# Patient Record
Sex: MALE | Race: WHITE | NOT HISPANIC OR LATINO | Employment: OTHER | URBAN - METROPOLITAN AREA
[De-identification: names, ages, dates, MRNs, and addresses within clinical notes are randomized per-mention and may not be internally consistent; named-entity substitution may affect disease eponyms.]

---

## 2021-04-09 ENCOUNTER — IMMUNIZATIONS (OUTPATIENT)
Dept: FAMILY MEDICINE CLINIC | Facility: HOSPITAL | Age: 81
End: 2021-04-09

## 2022-03-04 ENCOUNTER — TELEPHONE (OUTPATIENT)
Dept: UROLOGY | Facility: MEDICAL CENTER | Age: 82
End: 2022-03-04

## 2022-03-04 NOTE — TELEPHONE ENCOUNTER
Please Triage -   New Patient- Cris Schuster     What is the reason for the patients appointment? Patient called stating he was referred to see Urology for Elevated PSA 5 82  Patient stated he will not available on 3/15, 17,22, and 28  Patient's wife has a lot of appointments  Please review and schedule  Patient is available afternoons  Imaging/Lab Results:      Do we accept the patient's insurance or is the patient Self-Pay? Provider & Plan: [facility]   Member ID#:       Has the patient had any previous urologist(s)?no        Have patient records been requested?in care every where        Has the patient had any outside testing done?every where       Does the patient have a personal history of cancer?no       Patient can be reached at :[phone]

## 2022-04-22 ENCOUNTER — OFFICE VISIT (OUTPATIENT)
Dept: UROLOGY | Facility: CLINIC | Age: 82
End: 2022-04-22
Payer: COMMERCIAL

## 2022-04-22 VITALS
RESPIRATION RATE: 18 BRPM | WEIGHT: 200.4 LBS | BODY MASS INDEX: 30.37 KG/M2 | OXYGEN SATURATION: 98 % | HEIGHT: 68 IN | DIASTOLIC BLOOD PRESSURE: 82 MMHG | HEART RATE: 85 BPM | SYSTOLIC BLOOD PRESSURE: 122 MMHG

## 2022-04-22 DIAGNOSIS — R97.20 ELEVATED PSA: Primary | ICD-10-CM

## 2022-04-22 PROCEDURE — 99203 OFFICE O/P NEW LOW 30 MIN: CPT | Performed by: PHYSICIAN ASSISTANT

## 2022-04-22 RX ORDER — AMOXICILLIN 500 MG/1
CAPSULE ORAL
COMMUNITY
Start: 2022-04-16

## 2022-04-22 RX ORDER — FLUTICASONE PROPIONATE 50 MCG
2 SPRAY, SUSPENSION (ML) NASAL DAILY
COMMUNITY
Start: 2021-11-18 | End: 2022-11-18

## 2022-04-22 RX ORDER — LATANOPROST 50 UG/ML
SOLUTION/ DROPS OPHTHALMIC
COMMUNITY
Start: 2022-03-14

## 2022-04-22 NOTE — PROGRESS NOTES
1  Elevated PSA  PSA Total, Diagnostic    PSA Total, Diagnostic         Assessment and plan:     1  Elevated PSA  - prostate moderately firm without any overt nodularity  - we discussed prostate cancer screening and the evaluation for prostate cancer   - we reviewed the potential for an underlying prostate cancer, and potential trajectories related to this  - will plan for surveillance at this time  - patient will go for a PSA next week  If stable from 5-6, then f/u 6 months repeat diagnostic PSA  If markedly rising, then may need mpMRI versus biopsy  - patient did have 4k score performed however reports results will not be released to them until RASHID performed (for insurance purposes)  He will contact our office with the laboratory information and a copy of our note will be forwarded as such  Sin Broderick PA-C      Chief Complaint     Elevated PSA    History of Present Illness     Cheryle Manjarrez is a [de-identified] y o  male presenting today as a new patient for elevated PSA  Reports he was having routine screening by PCP  PSA trend:  5 2 (8/26/21)  PSA 5 82 (11/30/21)    Reports some mild progressive LUTS with weakened stream  Denies dysuria, gross hematuria, urinary infections, urinary incontinence  Off of pharmacotherapy for his prostate or bladder  3    Reports limited prostate screening over his lifetime with his last RASHID being >20 years ago  Denies any previous history of  surgical manipulation  Review of Systems     Review of Systems   Constitutional: Negative for activity change, appetite change, chills, diaphoresis, fatigue, fever and unexpected weight change  Respiratory: Negative for chest tightness and shortness of breath  Cardiovascular: Negative for chest pain, palpitations and leg swelling  Gastrointestinal: Negative for abdominal distention, abdominal pain, constipation, diarrhea, nausea and vomiting     Genitourinary: Positive for decreased urine volume (weakened stream)  Negative for difficulty urinating, dysuria, enuresis, flank pain, frequency, genital sores, hematuria and urgency  Musculoskeletal: Negative for back pain, gait problem and myalgias  Skin: Negative for color change, pallor, rash and wound  Psychiatric/Behavioral: Negative for behavioral problems  The patient is not nervous/anxious  Allergies     No Known Allergies    Physical Exam     Physical Exam  Constitutional:       General: He is not in acute distress  Appearance: Normal appearance  He is normal weight  He is not ill-appearing, toxic-appearing or diaphoretic  HENT:      Head: Normocephalic and atraumatic  Eyes:      General:         Right eye: No discharge  Left eye: No discharge  Conjunctiva/sclera: Conjunctivae normal    Pulmonary:      Effort: Pulmonary effort is normal  No respiratory distress  Genitourinary:     Comments: Good rectal tone  Prostate 50g, moderately firm without any over nodularities  Musculoskeletal:         General: No swelling or tenderness  Normal range of motion  Skin:     General: Skin is warm and dry  Coloration: Skin is not jaundiced or pale  Neurological:      General: No focal deficit present  Mental Status: He is alert and oriented to person, place, and time     Psychiatric:         Mood and Affect: Mood normal          Behavior: Behavior normal            Vital Signs     Vitals:    04/22/22 1333   BP: 122/82   BP Location: Left arm   Patient Position: Sitting   Cuff Size: Adult   Pulse: 85   Resp: 18   SpO2: 98%   Weight: 90 9 kg (200 lb 6 4 oz)   Height: 5' 8" (1 727 m)         Current Medications       Current Outpatient Medications:     latanoprost (XALATAN) 0 005 % ophthalmic solution, , Disp: , Rfl:     amoxicillin (AMOXIL) 500 mg capsule, , Disp: , Rfl:     fluticasone (FLONASE) 50 mcg/act nasal spray, 2 sprays into each nostril daily (Patient not taking: Reported on 4/22/2022 ), Disp: , Rfl:       Active Problems     There is no problem list on file for this patient  Past Medical History     History reviewed  No pertinent past medical history        Surgical History     Past Surgical History:   Procedure Laterality Date    TONSILLECTOMY      child          Family History     Family History   Problem Relation Age of Onset    Diabetes Mother     Hypertension Mother          Social History     Social History       Radiology

## 2022-04-26 LAB — PSA SERPL-MCNC: 5.33 NG/ML

## 2022-04-27 ENCOUNTER — TELEPHONE (OUTPATIENT)
Dept: UROLOGY | Facility: CLINIC | Age: 82
End: 2022-04-27

## 2022-04-27 NOTE — TELEPHONE ENCOUNTER
----- Message from Kim Jules PA-C sent at 4/27/2022  7:29 AM EDT -----  Please let patient know PSA is stable 5 3  f/u 6 months as scheduled

## 2022-04-27 NOTE — TELEPHONE ENCOUNTER
Detailed VM left per communication consent detailing kims note    Office number provided for any further questions

## 2022-04-29 ENCOUNTER — TELEPHONE (OUTPATIENT)
Dept: OTHER | Facility: OTHER | Age: 82
End: 2022-04-29

## 2022-04-29 NOTE — TELEPHONE ENCOUNTER
Leny Heart (Self) [phone] (M)     Is calling to inform the office of the below information from his visit on 04-  [place] [address] [address], [address] [place] [address]    [phone]

## 2022-10-24 ENCOUNTER — OFFICE VISIT (OUTPATIENT)
Dept: UROLOGY | Facility: CLINIC | Age: 82
End: 2022-10-24
Payer: COMMERCIAL

## 2022-10-24 VITALS
OXYGEN SATURATION: 100 % | BODY MASS INDEX: 30.49 KG/M2 | DIASTOLIC BLOOD PRESSURE: 90 MMHG | HEART RATE: 79 BPM | HEIGHT: 68 IN | SYSTOLIC BLOOD PRESSURE: 160 MMHG | WEIGHT: 201.2 LBS

## 2022-10-24 DIAGNOSIS — R97.20 ELEVATED PSA: Primary | ICD-10-CM

## 2022-10-24 PROCEDURE — 99213 OFFICE O/P EST LOW 20 MIN: CPT | Performed by: PHYSICIAN ASSISTANT

## 2022-10-24 NOTE — PROGRESS NOTES
1  Elevated PSA  PSA Total, Diagnostic    PSA Total, Diagnostic    PSA Total, Diagnostic         Assessment and plan:     1  Elevated PSA  - recent normal RASHID  - PSA was remains stable and his age-appropriate  - will return to clinic in 1 year with a repeat PSA prior  Patient has some trepidation and wishes to repeat his PSA in 6 months which is reasonable  Assuming his PSA remains stable over the next year routine screening can be discontinued thereafter     Yassine Acuña      Chief Complaint     Elevated PSA    History of Present Illness     Cash Goins is a [de-identified] y o  male presenting today for follow up of elevated PSA  Reports he was having routine screening by PCP  PSA trend:  5 2 (8/26/21)  5 82 (11/30/21)  5 33 (4/26/22)    Reports some mild progressive LUTS with weakened stream  Denies dysuria, gross hematuria, urinary infections, urinary incontinence  Off of pharmacotherapy for his prostate or bladder  3    Reports limited prostate screening over his lifetime with his last RASHID being >20 years ago  Denies any previous history of  surgical manipulation  Review of Systems     Review of Systems   Constitutional: Negative for activity change, appetite change, chills, diaphoresis, fatigue, fever and unexpected weight change  Respiratory: Negative for chest tightness and shortness of breath  Cardiovascular: Negative for chest pain, palpitations and leg swelling  Gastrointestinal: Negative for abdominal distention, abdominal pain, constipation, diarrhea, nausea and vomiting  Genitourinary: Positive for decreased urine volume (weakened stream)  Negative for difficulty urinating, dysuria, enuresis, flank pain, frequency, genital sores, hematuria and urgency  Musculoskeletal: Negative for back pain, gait problem and myalgias  Skin: Negative for color change, pallor, rash and wound  Psychiatric/Behavioral: Negative for behavioral problems  The patient is not nervous/anxious  Allergies     No Known Allergies    Physical Exam     Physical Exam  Constitutional:       General: He is not in acute distress  Appearance: Normal appearance  He is normal weight  He is not ill-appearing, toxic-appearing or diaphoretic  HENT:      Head: Normocephalic and atraumatic  Eyes:      General:         Right eye: No discharge  Left eye: No discharge  Conjunctiva/sclera: Conjunctivae normal    Pulmonary:      Effort: Pulmonary effort is normal  No respiratory distress  Genitourinary:     Comments: Good rectal tone  Prostate 50g, moderately firm without any over nodularities  Musculoskeletal:         General: No swelling or tenderness  Normal range of motion  Skin:     General: Skin is warm and dry  Coloration: Skin is not jaundiced or pale  Neurological:      General: No focal deficit present  Mental Status: He is alert and oriented to person, place, and time  Psychiatric:         Mood and Affect: Mood normal          Behavior: Behavior normal          Vital Signs     Vitals:    10/24/22 1319   BP: 160/90   BP Location: Left arm   Patient Position: Sitting   Cuff Size: Standard   Pulse: 79   SpO2: 100%   Weight: 91 3 kg (201 lb 3 2 oz)   Height: 5' 8" (1 727 m)         Current Medications       Current Outpatient Medications:   •  latanoprost (XALATAN) 0 005 % ophthalmic solution, , Disp: , Rfl:   •  amoxicillin (AMOXIL) 500 mg capsule, , Disp: , Rfl:   •  fluticasone (FLONASE) 50 mcg/act nasal spray, 2 sprays into each nostril daily (Patient not taking: Reported on 4/22/2022 ), Disp: , Rfl:       Active Problems     There is no problem list on file for this patient  Past Medical History     History reviewed  No pertinent past medical history        Surgical History     Past Surgical History:   Procedure Laterality Date   • TONSILLECTOMY      child          Family History     Family History   Problem Relation Age of Onset   • Diabetes Mother    • Hypertension Mother          Social History     Social History       Radiology

## 2023-02-25 LAB — PSA SERPL-MCNC: 5.81 NG/ML

## 2023-06-22 ENCOUNTER — TELEPHONE (OUTPATIENT)
Dept: UROLOGY | Facility: CLINIC | Age: 83
End: 2023-06-22

## 2023-06-22 LAB — PSA SERPL-MCNC: 6.83 NG/ML

## 2023-06-22 NOTE — TELEPHONE ENCOUNTER
Called patient l/m to call back regarding the message below     ----- Message from Bev Frye PA-C sent at 6/22/2023  8:10 AM EDT -----  Please make sure patient has a follow up visit for physical exam and to review PSA  Rishabh Toure thanks

## 2023-08-31 ENCOUNTER — HOSPITAL ENCOUNTER (EMERGENCY)
Facility: HOSPITAL | Age: 83
End: 2023-09-01
Attending: EMERGENCY MEDICINE
Payer: COMMERCIAL

## 2023-08-31 DIAGNOSIS — N13.2 URETERAL STONE WITH HYDRONEPHROSIS: Primary | ICD-10-CM

## 2023-08-31 PROCEDURE — 99284 EMERGENCY DEPT VISIT MOD MDM: CPT

## 2023-09-01 ENCOUNTER — ANESTHESIA (INPATIENT)
Dept: PERIOP | Facility: HOSPITAL | Age: 83
End: 2023-09-01
Payer: COMMERCIAL

## 2023-09-01 ENCOUNTER — ANESTHESIA EVENT (INPATIENT)
Dept: PERIOP | Facility: HOSPITAL | Age: 83
End: 2023-09-01
Payer: COMMERCIAL

## 2023-09-01 ENCOUNTER — HOSPITAL ENCOUNTER (OUTPATIENT)
Facility: HOSPITAL | Age: 83
LOS: 1 days | Discharge: HOME | End: 2023-09-02
Attending: INTERNAL MEDICINE | Admitting: INTERNAL MEDICINE
Payer: COMMERCIAL

## 2023-09-01 ENCOUNTER — APPOINTMENT (INPATIENT)
Dept: RADIOLOGY | Facility: HOSPITAL | Age: 83
End: 2023-09-01
Payer: COMMERCIAL

## 2023-09-01 ENCOUNTER — APPOINTMENT (EMERGENCY)
Dept: CT IMAGING | Facility: HOSPITAL | Age: 83
End: 2023-09-01
Payer: COMMERCIAL

## 2023-09-01 VITALS
SYSTOLIC BLOOD PRESSURE: 165 MMHG | RESPIRATION RATE: 17 BRPM | DIASTOLIC BLOOD PRESSURE: 74 MMHG | TEMPERATURE: 98.7 F | OXYGEN SATURATION: 97 % | HEART RATE: 75 BPM

## 2023-09-01 DIAGNOSIS — K59.00 CONSTIPATION: ICD-10-CM

## 2023-09-01 DIAGNOSIS — H40.9 GLAUCOMA: Primary | ICD-10-CM

## 2023-09-01 DIAGNOSIS — N13.2 URETERAL STONE WITH HYDRONEPHROSIS: ICD-10-CM

## 2023-09-01 PROBLEM — N17.9 ACUTE KIDNEY FAILURE (HCC): Status: ACTIVE | Noted: 2023-09-01

## 2023-09-01 PROBLEM — E66.9 OBESITY (BMI 30-39.9): Status: ACTIVE | Noted: 2023-09-01

## 2023-09-01 PROBLEM — N40.0 BPH (BENIGN PROSTATIC HYPERPLASIA): Status: ACTIVE | Noted: 2023-09-01

## 2023-09-01 LAB
ALBUMIN SERPL BCP-MCNC: 4.2 G/DL (ref 3.5–5)
ALP SERPL-CCNC: 66 U/L (ref 34–104)
ALT SERPL W P-5'-P-CCNC: 14 U/L (ref 7–52)
ANION GAP SERPL CALCULATED.3IONS-SCNC: 9 MMOL/L
AST SERPL W P-5'-P-CCNC: 32 U/L (ref 13–39)
BASOPHILS # BLD AUTO: 0.03 THOUSANDS/ÂΜL (ref 0–0.1)
BASOPHILS NFR BLD AUTO: 0 % (ref 0–1)
BILIRUB SERPL-MCNC: 1.23 MG/DL (ref 0.2–1)
BILIRUB UR QL STRIP: NEGATIVE
BUN SERPL-MCNC: 24 MG/DL (ref 5–25)
CALCIUM SERPL-MCNC: 9.2 MG/DL (ref 8.4–10.2)
CHLORIDE SERPL-SCNC: 102 MMOL/L (ref 96–108)
CLARITY UR: CLEAR
CO2 SERPL-SCNC: 25 MMOL/L (ref 21–32)
COLOR UR: ABNORMAL
CREAT SERPL-MCNC: 2.09 MG/DL (ref 0.6–1.3)
EOSINOPHIL # BLD AUTO: 0.01 THOUSAND/ÂΜL (ref 0–0.61)
EOSINOPHIL NFR BLD AUTO: 0 % (ref 0–6)
ERYTHROCYTE [DISTWIDTH] IN BLOOD BY AUTOMATED COUNT: 12.5 % (ref 11.6–15.1)
GFR SERPL CREATININE-BSD FRML MDRD: 28 ML/MIN/1.73SQ M
GLUCOSE SERPL-MCNC: 138 MG/DL (ref 65–140)
GLUCOSE UR STRIP-MCNC: NEGATIVE MG/DL
HCT VFR BLD AUTO: 40.5 % (ref 36.5–49.3)
HGB BLD-MCNC: 13.4 G/DL (ref 12–17)
HGB UR QL STRIP.AUTO: NEGATIVE
IMM GRANULOCYTES # BLD AUTO: 0.2 THOUSAND/UL (ref 0–0.2)
IMM GRANULOCYTES NFR BLD AUTO: 2 % (ref 0–2)
KETONES UR STRIP-MCNC: ABNORMAL MG/DL
LEUKOCYTE ESTERASE UR QL STRIP: NEGATIVE
LIPASE SERPL-CCNC: <6 U/L (ref 11–82)
LYMPHOCYTES # BLD AUTO: 1.04 THOUSANDS/ÂΜL (ref 0.6–4.47)
LYMPHOCYTES NFR BLD AUTO: 13 % (ref 14–44)
MAGNESIUM SERPL-MCNC: 2.1 MG/DL (ref 1.9–2.7)
MCH RBC QN AUTO: 30 PG (ref 26.8–34.3)
MCHC RBC AUTO-ENTMCNC: 33.1 G/DL (ref 31.4–37.4)
MCV RBC AUTO: 91 FL (ref 82–98)
MONOCYTES # BLD AUTO: 0.86 THOUSAND/ÂΜL (ref 0.17–1.22)
MONOCYTES NFR BLD AUTO: 10 % (ref 4–12)
NEUTROPHILS # BLD AUTO: 6.17 THOUSANDS/ÂΜL (ref 1.85–7.62)
NEUTS SEG NFR BLD AUTO: 75 % (ref 43–75)
NITRITE UR QL STRIP: NEGATIVE
NRBC BLD AUTO-RTO: 0 /100 WBCS
PH UR STRIP.AUTO: 5.5 [PH]
PLATELET # BLD AUTO: 151 THOUSANDS/UL (ref 149–390)
PMV BLD AUTO: 9.4 FL (ref 8.9–12.7)
POTASSIUM SERPL-SCNC: 4 MMOL/L (ref 3.5–5.3)
PROT SERPL-MCNC: 7.4 G/DL (ref 6.4–8.4)
PROT UR STRIP-MCNC: NEGATIVE MG/DL
RBC # BLD AUTO: 4.46 MILLION/UL (ref 3.88–5.62)
SODIUM SERPL-SCNC: 136 MMOL/L (ref 135–147)
SP GR UR STRIP.AUTO: 1.01 (ref 1–1.03)
UROBILINOGEN UR STRIP-ACNC: <2 MG/DL
WBC # BLD AUTO: 8.31 THOUSAND/UL (ref 4.31–10.16)

## 2023-09-01 PROCEDURE — 96360 HYDRATION IV INFUSION INIT: CPT

## 2023-09-01 PROCEDURE — 99215 OFFICE O/P EST HI 40 MIN: CPT | Performed by: UROLOGY

## 2023-09-01 PROCEDURE — 36415 COLL VENOUS BLD VENIPUNCTURE: CPT

## 2023-09-01 PROCEDURE — 99223 1ST HOSP IP/OBS HIGH 75: CPT | Performed by: INTERNAL MEDICINE

## 2023-09-01 PROCEDURE — 81003 URINALYSIS AUTO W/O SCOPE: CPT

## 2023-09-01 PROCEDURE — 85025 COMPLETE CBC W/AUTO DIFF WBC: CPT

## 2023-09-01 PROCEDURE — 80053 COMPREHEN METABOLIC PANEL: CPT

## 2023-09-01 PROCEDURE — 83690 ASSAY OF LIPASE: CPT

## 2023-09-01 PROCEDURE — 99285 EMERGENCY DEPT VISIT HI MDM: CPT | Performed by: EMERGENCY MEDICINE

## 2023-09-01 PROCEDURE — C1758 CATHETER, URETERAL: HCPCS | Performed by: UROLOGY

## 2023-09-01 PROCEDURE — 96361 HYDRATE IV INFUSION ADD-ON: CPT

## 2023-09-01 PROCEDURE — C2617 STENT, NON-COR, TEM W/O DEL: HCPCS | Performed by: UROLOGY

## 2023-09-01 PROCEDURE — C1769 GUIDE WIRE: HCPCS | Performed by: UROLOGY

## 2023-09-01 PROCEDURE — 74420 UROGRAPHY RTRGR +-KUB: CPT

## 2023-09-01 PROCEDURE — 83735 ASSAY OF MAGNESIUM: CPT

## 2023-09-01 PROCEDURE — 87086 URINE CULTURE/COLONY COUNT: CPT | Performed by: UROLOGY

## 2023-09-01 PROCEDURE — 52356 CYSTO/URETERO W/LITHOTRIPSY: CPT | Performed by: UROLOGY

## 2023-09-01 PROCEDURE — 82360 CALCULUS ASSAY QUANT: CPT | Performed by: UROLOGY

## 2023-09-01 PROCEDURE — 99024 POSTOP FOLLOW-UP VISIT: CPT | Performed by: UROLOGY

## 2023-09-01 PROCEDURE — G1004 CDSM NDSC: HCPCS

## 2023-09-01 PROCEDURE — 74176 CT ABD & PELVIS W/O CONTRAST: CPT

## 2023-09-01 DEVICE — INLAY OPTIMA URETERAL STENT W/O GUIDEWIRE
Type: IMPLANTABLE DEVICE | Site: URETER | Status: FUNCTIONAL
Brand: BARD® INLAY OPTIMA® URETERAL STENT

## 2023-09-01 RX ORDER — POLYETHYLENE GLYCOL 3350 17 G/17G
17 POWDER, FOR SOLUTION ORAL DAILY
Status: DISCONTINUED | OUTPATIENT
Start: 2023-09-02 | End: 2023-09-02 | Stop reason: HOSPADM

## 2023-09-01 RX ORDER — SODIUM CHLORIDE, SODIUM GLUCONATE, SODIUM ACETATE, POTASSIUM CHLORIDE, MAGNESIUM CHLORIDE, SODIUM PHOSPHATE, DIBASIC, AND POTASSIUM PHOSPHATE .53; .5; .37; .037; .03; .012; .00082 G/100ML; G/100ML; G/100ML; G/100ML; G/100ML; G/100ML; G/100ML
75 INJECTION, SOLUTION INTRAVENOUS CONTINUOUS
Status: DISCONTINUED | OUTPATIENT
Start: 2023-09-01 | End: 2023-09-02 | Stop reason: HOSPADM

## 2023-09-01 RX ORDER — ONDANSETRON 2 MG/ML
INJECTION INTRAMUSCULAR; INTRAVENOUS AS NEEDED
Status: DISCONTINUED | OUTPATIENT
Start: 2023-09-01 | End: 2023-09-01

## 2023-09-01 RX ORDER — ACETAMINOPHEN 325 MG/1
650 TABLET ORAL EVERY 6 HOURS PRN
Status: DISCONTINUED | OUTPATIENT
Start: 2023-09-01 | End: 2023-09-02 | Stop reason: HOSPADM

## 2023-09-01 RX ORDER — HYDROMORPHONE HCL/PF 1 MG/ML
0.5 SYRINGE (ML) INJECTION
Status: DISCONTINUED | OUTPATIENT
Start: 2023-09-01 | End: 2023-09-01

## 2023-09-01 RX ORDER — MAGNESIUM HYDROXIDE 1200 MG/15ML
LIQUID ORAL AS NEEDED
Status: DISCONTINUED | OUTPATIENT
Start: 2023-09-01 | End: 2023-09-01 | Stop reason: HOSPADM

## 2023-09-01 RX ORDER — LIDOCAINE HYDROCHLORIDE 10 MG/ML
INJECTION, SOLUTION EPIDURAL; INFILTRATION; INTRACAUDAL; PERINEURAL AS NEEDED
Status: DISCONTINUED | OUTPATIENT
Start: 2023-09-01 | End: 2023-09-01

## 2023-09-01 RX ORDER — PROMETHAZINE HYDROCHLORIDE 25 MG/ML
25 INJECTION, SOLUTION INTRAMUSCULAR; INTRAVENOUS ONCE AS NEEDED
Status: DISCONTINUED | OUTPATIENT
Start: 2023-09-01 | End: 2023-09-01

## 2023-09-01 RX ORDER — ACETAMINOPHEN 325 MG/1
975 TABLET ORAL ONCE
Status: COMPLETED | OUTPATIENT
Start: 2023-09-01 | End: 2023-09-01

## 2023-09-01 RX ORDER — ONDANSETRON 2 MG/ML
4 INJECTION INTRAMUSCULAR; INTRAVENOUS EVERY 6 HOURS PRN
Status: DISCONTINUED | OUTPATIENT
Start: 2023-09-01 | End: 2023-09-02 | Stop reason: HOSPADM

## 2023-09-01 RX ORDER — SENNOSIDES 8.6 MG
2 TABLET ORAL DAILY
Status: DISCONTINUED | OUTPATIENT
Start: 2023-09-01 | End: 2023-09-02 | Stop reason: HOSPADM

## 2023-09-01 RX ORDER — LATANOPROST 50 UG/ML
1 SOLUTION/ DROPS OPHTHALMIC
Status: DISCONTINUED | OUTPATIENT
Start: 2023-09-01 | End: 2023-09-02 | Stop reason: HOSPADM

## 2023-09-01 RX ORDER — CEFAZOLIN SODIUM 1 G/3ML
INJECTION, POWDER, FOR SOLUTION INTRAMUSCULAR; INTRAVENOUS AS NEEDED
Status: DISCONTINUED | OUTPATIENT
Start: 2023-09-01 | End: 2023-09-01

## 2023-09-01 RX ORDER — SODIUM CHLORIDE, SODIUM LACTATE, POTASSIUM CHLORIDE, CALCIUM CHLORIDE 600; 310; 30; 20 MG/100ML; MG/100ML; MG/100ML; MG/100ML
INJECTION, SOLUTION INTRAVENOUS CONTINUOUS PRN
Status: DISCONTINUED | OUTPATIENT
Start: 2023-09-01 | End: 2023-09-01

## 2023-09-01 RX ORDER — TAMSULOSIN HYDROCHLORIDE 0.4 MG/1
0.4 CAPSULE ORAL ONCE
Status: COMPLETED | OUTPATIENT
Start: 2023-09-01 | End: 2023-09-01

## 2023-09-01 RX ORDER — PROPOFOL 10 MG/ML
INJECTION, EMULSION INTRAVENOUS AS NEEDED
Status: DISCONTINUED | OUTPATIENT
Start: 2023-09-01 | End: 2023-09-01

## 2023-09-01 RX ORDER — CEPHALEXIN 500 MG/1
500 CAPSULE ORAL EVERY 8 HOURS SCHEDULED
Status: DISCONTINUED | OUTPATIENT
Start: 2023-09-01 | End: 2023-09-02 | Stop reason: HOSPADM

## 2023-09-01 RX ORDER — HEPARIN SODIUM 5000 [USP'U]/ML
5000 INJECTION, SOLUTION INTRAVENOUS; SUBCUTANEOUS EVERY 8 HOURS SCHEDULED
Status: DISCONTINUED | OUTPATIENT
Start: 2023-09-01 | End: 2023-09-02 | Stop reason: HOSPADM

## 2023-09-01 RX ORDER — ONDANSETRON 2 MG/ML
4 INJECTION INTRAMUSCULAR; INTRAVENOUS ONCE AS NEEDED
Status: DISCONTINUED | OUTPATIENT
Start: 2023-09-01 | End: 2023-09-01

## 2023-09-01 RX ORDER — BISACODYL 10 MG
10 SUPPOSITORY, RECTAL RECTAL DAILY PRN
Status: DISCONTINUED | OUTPATIENT
Start: 2023-09-01 | End: 2023-09-02 | Stop reason: HOSPADM

## 2023-09-01 RX ORDER — HYDROCODONE BITARTRATE AND ACETAMINOPHEN 5; 325 MG/1; MG/1
1 TABLET ORAL EVERY 4 HOURS PRN
Status: DISCONTINUED | OUTPATIENT
Start: 2023-09-01 | End: 2023-09-02 | Stop reason: HOSPADM

## 2023-09-01 RX ORDER — DEXAMETHASONE SODIUM PHOSPHATE 10 MG/ML
INJECTION, SOLUTION INTRAMUSCULAR; INTRAVENOUS AS NEEDED
Status: DISCONTINUED | OUTPATIENT
Start: 2023-09-01 | End: 2023-09-01

## 2023-09-01 RX ORDER — FENTANYL CITRATE/PF 50 MCG/ML
50 SYRINGE (ML) INJECTION
Status: DISCONTINUED | OUTPATIENT
Start: 2023-09-01 | End: 2023-09-01

## 2023-09-01 RX ORDER — FENTANYL CITRATE 50 UG/ML
INJECTION, SOLUTION INTRAMUSCULAR; INTRAVENOUS AS NEEDED
Status: DISCONTINUED | OUTPATIENT
Start: 2023-09-01 | End: 2023-09-01

## 2023-09-01 RX ADMIN — TAMSULOSIN HYDROCHLORIDE 0.4 MG: 0.4 CAPSULE ORAL at 04:31

## 2023-09-01 RX ADMIN — PROPOFOL 160 MG: 10 INJECTION, EMULSION INTRAVENOUS at 13:43

## 2023-09-01 RX ADMIN — SODIUM CHLORIDE 1000 ML: 0.9 INJECTION, SOLUTION INTRAVENOUS at 04:08

## 2023-09-01 RX ADMIN — HEPARIN SODIUM 5000 UNITS: 5000 INJECTION INTRAVENOUS; SUBCUTANEOUS at 22:03

## 2023-09-01 RX ADMIN — FENTANYL CITRATE 50 MCG: 50 INJECTION INTRAMUSCULAR; INTRAVENOUS at 13:59

## 2023-09-01 RX ADMIN — FENTANYL CITRATE 50 MCG: 50 INJECTION INTRAMUSCULAR; INTRAVENOUS at 13:43

## 2023-09-01 RX ADMIN — DEXAMETHASONE SODIUM PHOSPHATE 10 MG: 10 INJECTION, SOLUTION INTRAMUSCULAR; INTRAVENOUS at 13:47

## 2023-09-01 RX ADMIN — LIDOCAINE HYDROCHLORIDE 50 MG: 10 INJECTION, SOLUTION EPIDURAL; INFILTRATION; INTRACAUDAL; PERINEURAL at 13:43

## 2023-09-01 RX ADMIN — SODIUM CHLORIDE, SODIUM LACTATE, POTASSIUM CHLORIDE, AND CALCIUM CHLORIDE: .6; .31; .03; .02 INJECTION, SOLUTION INTRAVENOUS at 13:17

## 2023-09-01 RX ADMIN — SODIUM CHLORIDE 1000 ML: 0.9 INJECTION, SOLUTION INTRAVENOUS at 00:34

## 2023-09-01 RX ADMIN — LATANOPROST 1 DROP: 50 SOLUTION OPHTHALMIC at 22:03

## 2023-09-01 RX ADMIN — FENTANYL CITRATE 50 MCG: 50 INJECTION INTRAMUSCULAR; INTRAVENOUS at 13:55

## 2023-09-01 RX ADMIN — ONDANSETRON 4 MG: 2 INJECTION INTRAMUSCULAR; INTRAVENOUS at 13:47

## 2023-09-01 RX ADMIN — CEPHALEXIN 500 MG: 500 CAPSULE ORAL at 22:03

## 2023-09-01 RX ADMIN — ACETAMINOPHEN 975 MG: 325 TABLET, FILM COATED ORAL at 10:33

## 2023-09-01 RX ADMIN — CEFAZOLIN 2000 MG: 1 INJECTION, POWDER, FOR SOLUTION INTRAMUSCULAR; INTRAVENOUS at 13:43

## 2023-09-01 RX ADMIN — PROPOFOL 40 MG: 10 INJECTION, EMULSION INTRAVENOUS at 13:47

## 2023-09-01 RX ADMIN — FENTANYL CITRATE 50 MCG: 50 INJECTION INTRAMUSCULAR; INTRAVENOUS at 13:47

## 2023-09-01 NOTE — PERIOPERATIVE NURSING NOTE
PACU to Floor TRANSFER NOTE      Pre-op Diagnosis:  Ureteral stone with hydronephrosis [N13.2]    Procedure Done:  CYSTOSCOPY URETEROSCOPY WITH LITHOTRIPSY HOLMIUM LASER, RETROGRADE PYELOGRAM , BAKET STONE EXTRACTION AND INSERTION STENT URETERAL (Left: Bladder)     Post Op Diagnosis:  * No post-op diagnosis entered *    Any Significant Events Intra-Op:  none    Abnormal Assessment in PACU: none    Level of Consciousness:       Last Set of VS:   Vitals:    09/01/23 1443 09/01/23 1445 09/01/23 1500 09/01/23 1515   BP: 132/68 143/71 156/64 123/62   Pulse: 93 76 80 80   Resp: 14 15 19 12   Temp: 98.8 °F (37.1 °C)  98.5 °F (36.9 °C)    SpO2: 98% 96% 97% 97%                Baseline Neuro/developmental Status: A&Ox4  Labs Collected: No  Special Needs of the Patient: NA  Antibiotics: Given in OR    MEDICATION LIST LAST 24 HOURS  Periop Administered Medications from 08/31/2023 0331 to 09/01/2023 1531       Date/Time Order Dose Route Action Action by Comments     09/01/2023 1343 EDT ceFAZolin (ANCEF) injection 2,000 mg Intravenous Given Witt Sat, CRNA --     09/01/2023 1347 EDT dexamethasone (PF) (DECADRON) injection 10 mg Intravenous Given Witt Sat, CRNA --     09/01/2023 1359 EDT fentanyl citrate (PF) 100 MCG/2ML 50 mcg Intravenous Given Witt Sat, CRNA --     09/01/2023 1355 EDT fentanyl citrate (PF) 100 MCG/2ML 50 mcg Intravenous Given Witt Sat, CRNA --     09/01/2023 1347 EDT fentanyl citrate (PF) 100 MCG/2ML 50 mcg Intravenous Given Witt Sat, CRNA --     09/01/2023 1343 EDT fentanyl citrate (PF) 100 MCG/2ML 50 mcg Intravenous Given Witt Sat, CRNA --     09/01/2023 1412 EDT iohexol (OMNIPAQUE) 240 MG/ML solution 10 mL Other Given Jer Reagan MD --     09/01/2023 1317 EDT lactated ringers infusion -- Intravenous New Bag Witt Sat, CRNA --     09/01/2023 1343 EDT lidocaine (PF) (XYLOCAINE-MPF) 1 % injection 50 mg Intravenous Given Anuj Jiang, CRNA --     09/01/2023 1347 EDT ondansetron (ZOFRAN) injection 4 mg Intravenous Given Anuj Pals, CRNA --     09/01/2023 1347 EDT propofol (DIPRIVAN) 200 MG/20ML bolus injection 40 mg Intravenous Given Anuj Pals, CRNA --     09/01/2023 1343 EDT propofol (DIPRIVAN) 200 MG/20ML bolus injection 160 mg Intravenous Given Anuj Jiang, CRNA --     09/01/2023 1413 EDT sodium chloride 0.9 % irrigation solution 500 mL Irrigation Given Lakisha Bosch MD --     09/01/2023 1413 EDT sterile water irrigation solution 1,000 mL Irrigation Given Lakisha Bosch MD --             IV Access:   Peripheral IV 09/01/23 Right Antecubital (Active)   Site Assessment WDL 09/01/23 1500   Dressing Type Transparent 09/01/23 1500   Line Status Infusing;Flushed 09/01/23 1500   Dressing Status Clean;Dry; Intact 09/01/23 1500     IV Fluids: multi-electrolyte, 75 mL/hr, Last Rate: 75 mL/hr (09/01/23 1420)        INTAKE / OUTPUT  No intake/output data recorded. WOUNDS  Wound 09/01/23 Penis N/A (Active)   Date First Assessed/Time First Assessed: 09/01/23 1414   Location: Penis  Wound Location Orientation: N/A  Wound Description (Comments): TEGADERM TO PENIS WITH STRING FROM STENT      Assessments 9/1/2023  2:20 PM 9/1/2023  3:00 PM   Wound Description Clean;Dry; Intact Clean;Dry; Intact   Ghazala-wound Assessment Clean;Dry; Intact Clean;Dry; Intact   Wound Site Closure Unable to assess Unable to assess   Drainage Amount None None   Dressing Open to air Open to air       No associated orders.        Wound 09/01/23 Scrotum N/A (Active)   Date First Assessed/Time First Assessed: 09/01/23 1414   Location: Scrotum  Wound Location Orientation: N/A      Assessments 9/1/2023  2:20 PM 9/1/2023  3:00 PM   Wound Description Unable to assess Unable to assess   Ghazala-wound Assessment Unable to assess Unable to assess   Wound Site Closure Unable to assess Unable to assess   Drainage Amount None None Dressing Open to air Open to air       No associated orders. Wound 09/01/23 Thigh N/A (Active)   Date First Assessed/Time First Assessed: 09/01/23 1414   Location: Thigh  Wound Location Orientation: N/A      Assessments 9/1/2023  2:20 PM 9/1/2023  3:00 PM   Wound Description Unable to assess Unable to assess   Ghazala-wound Assessment Unable to assess Unable to assess   Wound Site Closure Unable to assess Unable to assess   Drainage Amount None None   Dressing Open to air Open to air       No associated orders.                                                 Time of Last Void or Time that Urinary Catheter was Removed Intra-Op: Did not void/due to void by 1700    Patient family members in attendance upon patient transport to floor:  Not present    Patient Belongings:  with patient @ bedside    Additional Information: none    Contact RN - Jose Villanueva, [address] Kevyn Bowers RN ext [phone]

## 2023-09-01 NOTE — ANESTHESIA POSTPROCEDURE EVALUATION
Post-Op Assessment Note    CV Status:  Stable  Pain Score: 0    Pain management: adequate     Mental Status:  Alert and awake   Hydration Status:  Euvolemic   PONV Controlled:  Controlled   Airway Patency:  Patent      Post Op Vitals Reviewed: Yes      Staff: CRNA         No notable events documented.     BP   129/55   Temp (!) 97.2 °F (36.2 °C) (09/01/23 1420)    Pulse  72   Resp   12   SpO2   99

## 2023-09-01 NOTE — CONSULTS
Consult - Urology   Li Anderson 1940, 81 y.o. male MRN: [id number]    Unit/Bed#: ED-04 Encounter: [id number]      Assessment & Plan:  1. 5 mm distal left ureteral calculus  2. Mild left hydronephrosis  3. RIZWAN  - CT scan showing Mild left hydronephrosis, hydroureter, and minimal perinephric/periureteral inflammatory stranding is seen secondary to an obstructing 5 mm distal left ureteral stone just above the UVJ region  - VSS, afebrile  - UA negative  - Cr 2.09, previous baseline 1.02  - Patient to be transferred to [facility]  - Discussed with patient cystoscopy, retrograde pyelogram, insertion of left ureteral stent. Possible ureteroscopy. Discussed with patient risks of procedure including bleeding, infection, damage to kidneys, ureter, bladder, inability to treat stone, need for delayed ureteroscopy. Discussed stent colic including frequency, urgency, hematuria, flank pain.  -Consent signed  -Transfer to [facility] for OR today    Subjective: Bull Graham is an 26-year-old with known to our practice who presented to the ED with left flank pain. Patient reports his pain started this morning. Patient reported intermittent left flank pain. Reports it progressively worsened which which prompted ED visit. In the ED CT scan was done showing a 5 mm distal left ureteral calculus with mild left hydronephrosis. CMP was done which showed a creatinine of 2.09, previous baseline 1.02. He denies any fever, chills, dysuria, frequency, urgency, shortness of breath, chest pain. He denies a previous history of kidney stones. Known to our practice for history of elevated PSA. Urology consulted for further management recommendations    Review of Systems   Constitutional: Negative for chills and fever. Respiratory: Negative for cough and shortness of breath. Cardiovascular: Negative for chest pain and palpitations. Gastrointestinal: Negative for abdominal pain and vomiting. Genitourinary: Positive for flank pain.  Negative for dysuria and hematuria. Musculoskeletal: Negative for arthralgias and back pain. Skin: Negative for color change and rash. Neurological: Negative for seizures and syncope. All other systems reviewed and are negative. Objective:  Vitals: Blood pressure 165/74, pulse 75, temperature 98.7 °F (37.1 °C), temperature source Oral, resp. rate 17, SpO2 97 %. ,There is no height or weight on file to calculate BMI. Physical Exam  Constitutional:       General: He is not in acute distress. Appearance: Normal appearance. He is normal weight. He is not ill-appearing or toxic-appearing. HENT:      Head: Normocephalic and atraumatic. Right Ear: External ear normal.      Left Ear: External ear normal.      Nose: Nose normal.      Mouth/Throat:      Mouth: Mucous membranes are moist.   Eyes:      General: No scleral icterus. Conjunctiva/sclera: Conjunctivae normal.   Cardiovascular:      Rate and Rhythm: Normal rate. Pulses: Normal pulses. Pulmonary:      Effort: Pulmonary effort is normal.   Abdominal:      General: Abdomen is flat. There is no distension. Palpations: Abdomen is soft. Tenderness: There is no abdominal tenderness. There is no right CVA tenderness, left CVA tenderness or guarding. Musculoskeletal:         General: Normal range of motion. Cervical back: Normal range of motion. Skin:     General: Skin is warm. Findings: No rash. Neurological:      General: No focal deficit present. Mental Status: He is alert and oriented to person, place, and time. Mental status is at baseline. Psychiatric:         Mood and Affect: Mood normal.         Behavior: Behavior normal.         Thought Content:  Thought content normal.         Judgment: Judgment normal.         Imaging:  CT ABDOMEN AND PELVIS WITHOUT IV CONTRAST     INDICATION:   Constipation w/ LLQ bloating, sanjay.     COMPARISON:  None.     TECHNIQUE:  CT examination of the abdomen and pelvis was performed without intravenous contrast. Multiplanar 2D reformatted images were created from the source data.     This examination, like all CT scans performed in the [facility], was performed utilizing techniques to minimize radiation dose exposure, including the use of iterative reconstruction and automated exposure control. Radiation dose length   product (DLP) for this visit:  893.18 mGy-cm     Enteric contrast was administered.     FINDINGS:     ABDOMEN     LOWER CHEST: Minimal bibasilar atelectasis. Otherwise no clinically significant abnormality identified in the visualized lower chest.     LIVER/BILIARY TREE:  Unremarkable.     GALLBLADDER:  No calcified gallstones. No pericholecystic inflammatory change.     SPLEEN:  Unremarkable.     PANCREAS:  Unremarkable.     ADRENAL GLANDS:  Unremarkable.     KIDNEYS/URETERS: Kidneys are normal in size and position. Right kidney is unremarkable. Mild left hydronephrosis, hydroureter, and minimal perinephric/periureteral inflammatory stranding is seen secondary to an obstructing 5 mm distal left ureteral stone   just above the UVJ region.     STOMACH AND BOWEL: Stomach is contracted limiting evaluation. No intraluminal mass. Wall thickening of the stomach could be due to under distention but cannot exclude nonspecific gastritis. The small and large bowel loops are normal in course and caliber   without evidence for obstruction or inflammation. Sigmoid cirrhosis with.     APPENDIX:  No findings to suggest appendicitis.     ABDOMINOPELVIC CAVITY: Trace pelvic ascites. No loculated fluid collections. No pneumoperitoneum. No lymphadenopathy.     VESSELS:  Unremarkable for patient's age.     PELVIS     REPRODUCTIVE ORGANS: Few prostatic calcifications noted. Prostate gland and seminal vesicles otherwise unremarkable for age.   URINARY BLADDER:  Unremarkable.     ABDOMINAL WALL/INGUINAL REGIONS:  Unremarkable.     OSSEOUS STRUCTURES:  No acute fracture or destructive osseous lesion. Multilevel degenerative changes of the thoracolumbar spine and bilateral hip joints, right greater than left.     IMPRESSION:     Mild left hydronephrosis, hydroureter, and minimal perinephric/periureteral inflammatory stranding is seen secondary to an obstructing 5 mm distal left ureteral stone just above the UVJ region. Other ancillary findings detailed above.           Workstation performed: [id number]    Labs:  Recent Labs     09/01/23 0033   WBC 8.31     Recent Labs     09/01/23 0033   HGB 13.4     Recent Labs     09/01/23 0033   CREATININE 2.09*         History:  Social History     Socioeconomic History   • Marital status: Unknown     Spouse name: None   • Number of children: None   • Years of education: None   • Highest education level: None   Occupational History   • None   Tobacco Use   • Smoking status: Never   • Smokeless tobacco: Never   Vaping Use   • Vaping Use: Never used   Substance and Sexual Activity   • Alcohol use: Never   • Drug use: Never   • Sexual activity: None   Other Topics Concern   • None   Social History Narrative   • None     Social Determinants of Health     Financial Resource Strain: Not on file   Food Insecurity: Not on file   Transportation Needs: Not on file   Physical Activity: Not on file   Stress: Not on file   Social Connections: Not on file   Intimate Partner Violence: Not on file   Housing Stability: Not on file     History reviewed. No pertinent past medical history.   Past Surgical History:   Procedure Laterality Date   • TONSILLECTOMY      child      Family History   Problem Relation Age of Onset   • Diabetes Mother    • Hypertension Mother        Marizol [place]  Date: 9/1/2023 Time: 9:45 AM

## 2023-09-01 NOTE — OP NOTE
OPERATIVE REPORT  PATIENT NAME: Ky Walter    :  1940  MRN: [id number]  Pt Location: [facility] ROOM 01    SURGERY DATE: 2023    Surgeon(s) and Role:     Jered Zazueta MD - Primary    Preop Diagnosis:  Ureteral stone with hydronephrosis [N13.2]    Post-Op Diagnosis Codes:     * Ureteral stone with hydronephrosis [N13.2]    Procedure(s):  Left - CYSTOSCOPY URETEROSCOPY WITH LITHOTRIPSY HOLMIUM LASER. RETROGRADE PYELOGRAM . BAKET STONE EXTRACTION AND INSERTION STENT URETERAL    Specimen(s):  ID Type Source Tests Collected by Time Destination   A : URINE CULTURE Urine Urine, Cystoscopic URINE CULTURE Jered Zazueta MD 2023 135    B : LEFT URETERAL CALCULUS  Calculus Ureter, Left STONE ANALYSIS Jered Zazueta MD 2023 1406        Estimated Blood Loss:   Minimal    Drains:  Left 24-6 Luxembourgish double-J ureteral stent with string    Anesthesia Type:   General    Operative Indications:  Ureteral stone with hydronephrosis [N13.2]      Operative Findings:  Large distal left ureteral calculus removed with a combination of holmium laser lithotripsy and basket stone extraction. Left 24-6 Luxembourgish double-J ureteral stent with string placed. Complications:   None    Procedure and Technique: Clif Wilkins is an 81-year-old male admitted to [facility] with a baseline creatinine of 1 and an admission creatinine of 2. CT scan shows a 5 mm distal left ureteral calculus with left-sided hydronephrosis. The patient was transferred to [address] [place] [address] as I was operating at that facility today and I was concerned about his acute kidney injury. Risk and benefits of cystoscopy with left ureteroscopy with holmium laser lithotripsy, left retrograde pyelography and left ureteral stent insertion were discussed and reviewed. Risks including, but not limited to bleeding, infection, ureteral injury, sepsis, need for additional surgery were discussed and reviewed.   Informed consent was obtained. The patient is brought to the operating room on September 1, 2023. After the smooth induction of general LMA anesthesia, the patient was placed in dorsolithotomy position. His genitalia is prepped and draped in sterile fashion. Intravenous antibiotics were administered. Timeout was performed with all members the operative team confirming the patient's identity, procedure to be performed, and laterality of the case. 25 Armenian rigid cystoscope with 30 degree lens was inserted. The bladder was thoroughly inspected. High bladder neck with BPH was noted. The bladder was entered. The left ureteral orifice was identified. The left ureteral orifice was cannulated with a 5 [place] open-ended catheter with the assistance of a wire. Retrograde pyelography showed a filling defect consistent with a distal ureteral calculus. A wire was secured as a safety and a short semirigid ureteroscope was then passed adjacent to the wire. Dina Beckford was engaged in the distal ureter. Began to utilize the 365 µm holmium laser fiber to chip away at the stone. The stone was pushed more proximally and I stopped with the laser to grasp the stone with a basket to ensure that it did not get pushed proximal into the ureter. As I began to gently pull the basket into the distal ureter I was able to fully remove the stone with minimal trauma to the ureter. The ureteroscope was repassed. There were no additional stones within the ureter. Contrast was injected as a roadmap for stent insertion. The wire was backloaded through the cystoscope and a left 24-6 Armenian double-J ureteral stent with string was placed. The proximal coil was appreciated within the left renal pelvis and the distal coil was visualized within the bladder. A string was left in place and secured to the dorsum of the phallus with Tegaderm. Overall the patient tolerated the procedure well and there were no complications.   The patient was extubated in the operating room and transferred the PACU in stable condition at the conclusion of the case.     Patient Disposition:  PACU stable and extubated        SIGNATURE: Bernadette Mclain MD  DATE: September 1, 2023  TIME: 2:31 PM

## 2023-09-01 NOTE — ANESTHESIA PREPROCEDURE EVALUATION
Procedure:  CYSTOSCOPY URETEROSCOPY WITH LITHOTRIPSY HOLMIUM LASER, RETROGRADE PYELOGRAM AND INSERTION STENT URETERAL (Left: Bladder)    Relevant Problems   ANESTHESIA  previous anesthetic at 10years old with ether - denies any complications. No other anesthetics      CARDIO (within normal limits)      GI/HEPATIC (within normal limits)      NEURO/PSYCH (within normal limits)      PULMONARY (within normal limits)        Physical Exam    Airway    Mallampati score: I  TM Distance: >3 FB  Neck ROM: full     Dental   No notable dental hx     Cardiovascular      Pulmonary      Other Findings        Anesthesia Plan  ASA Score- 1     Anesthesia Type- general with ASA Monitors. Additional Monitors:   Airway Plan: LMA. Plan Factors-Exercise tolerance (METS): >4 METS. Chart reviewed. Existing labs reviewed. Patient summary reviewed. Induction- intravenous. Postoperative Plan- . Planned trial extubation    Informed Consent- Anesthetic plan and risks discussed with patient. I personally reviewed this patient with the CRNA. Discussed and agreed on the Anesthesia Plan with the CRNA. Andrew Jimenes

## 2023-09-01 NOTE — ASSESSMENT & PLAN NOTE
· Baseline creatinine appears to 1.0 based on [facility] records  · Unclear etiology but possibly due to hydronephrosis, although this is unilateral  · Check PVRs and utilize retention protocol given his history of BPH  · IV hydration overnight and monitor for response  · BMP in AM

## 2023-09-01 NOTE — PROGRESS NOTES
Today I removed Tuan's distal left ureteral calculus. He has a left ureteral stent in place with a string. I recommend admission to the medical service with observation overnight. I would repeat a basic metabolic panel in the morning of September 2, 2023. As long as his creatinine trends towards normalization he can be discharged home with a plan to pull his own stent on Monday next week. 3 days of Keflex is advised for discharge.

## 2023-09-01 NOTE — ED ATTENDING ATTESTATION
8/31/2023  I, Zenaida Lopez MD, saw and evaluated the patient. I have discussed the patient with the resident/non-physician practitioner and agree with the resident's/non-physician practitioner's findings, Plan of Care, and MDM as documented in the resident's/non-physician practitioner's note, except where noted. All available labs and Radiology studies were reviewed. I was present for key portions of any procedure(s) performed by the resident/non-physician practitioner and I was immediately available to provide assistance. At this point I agree with the current assessment done in the Emergency Department. I have conducted an independent evaluation of this patient a history and physical is as follows:    81-year-old male presents to the emergency department for evaluation having been unable to have a bowel movement for the last 4 days. The couple of days prior he needs to strain more than usual in order to pass the stool. He typically uses fiber supplements to stay regular and has stool passage 2-3 times daily. He has not had his usual psyllium supplement for the last 3 to 4 weeks as this was not available from the  but had not noticed any changes in stool pattern up until the last few days. No changes in diet preceding symptoms. He did eat breakfast out on Sunday morning but ate food similar to that which she would normally consume. Appetite has been significantly decreased since and he has been concerned that it might exacerbate symptoms. He describes significant pressure and intermittent discomfort in the left lower abdomen. Currently he is without pain although he did take a tablet of oxycodone/acetaminophen shortly prior to arrival.  (This had been prescribed around the time of a dental procedure remotely). He denies having required this on any other recent days.   He denies having been able to pass flatus over the last few days although does not specifically have urged to do so. No nausea, vomiting or fevers. He has not noticed any changes with urination. No history of intestinal problems. He does have BPH and is checked regularly for this. No history of abdominal surgeries. Of note, wife does have history of diverticulitis and required resection for this. She notes that symptoms are similar to what she has experienced. On exam patient appears comfortable seated upright in the stretcher. Mucous membranes are moist.  No pallor nor icterus. Heart sounds regular. Lungs clear to auscultation bilaterally. Abdomen is mildly distended. Bowel sounds are normal.  Some fullness is appreciated in the left lower quadrant. He denies specifically having tenderness at this time. Rectal exam performed by Dr. Tequila Jennings. He did not appreciate large quantity of stool in the rectal vault. Differential diagnosis includes but is not limited to constipation, diverticulitis, colitis, colonic mass or stricture, bowel obstruction, volvulus, ileus. ED Course  ED Course as of 09/01/23 0412   Fri Sep 01, 2023   0147 Creatinine significantly decreased from October 2022. He denies decreased intake until the last couple of days. Potentially this is on account of dehydration. Additionally have concern for possible obstructive process given BPH and now constipation. Will assess for hydronephrosis. CT scan will be performed without contrast given low GFR. Patient found to have obstructive unilateral ureteral stone. Dr. Tequila Jennings was in contact with urology team.  Patient will be transferred [facility] for procedural intervention.     Critical Care Time  Procedures

## 2023-09-01 NOTE — EMTALA/ACUTE CARE TRANSFER
[address] [address]  Dept: [phone]      EMTALA TRANSFER CONSENT    NAME Anny Latif                                         1940                              MRN [id number]    I have been informed of my rights regarding examination, treatment, and transfer   by Dr. Dolly Martin*    Benefits: Specialized equipment and/or services available at the receiving facility (Include comment)________________________ (Urology)    Risks: Potential for delay in receiving treatment, Potential deterioration of medical condition, Loss of IV, Increased discomfort during transfer, Possible worsening of condition or death during transfer      Transfer Request   I acknowledge that my medical condition has been evaluated and explained to me by the emergency department physician or other qualified medical person and/or my attending physician who has recommended and offered to me further medical examination and treatment. I understand the Hospital's obligation with respect to the treatment and stabilization of my emergency medical condition. I nevertheless request to be transferred. I release the Hospital, the doctor, and any other persons caring for me from all responsibility or liability for any injury or ill effects that may result from my transfer and agree to accept all responsibility for the consequences of my choice to transfer, rather than receive stabilizing treatment at the Hospital. I understand that because the transfer is my request, my insurance may not provide reimbursement for the services. The Hospital will assist and direct me and my family in how to make arrangements for transfer, but the hospital is not liable for any fees charged by the transport service.   In spite of this understanding, I refuse to consent to further medical examination and treatment which has been offered to me, and request transfer to Accepting Facility Name, [facility] : [facility], [place], [place]. I authorize the performance of emergency medical procedures and treatments upon me in both transit and upon arrival at the receiving facility. Additionally, I authorize the release of any and all medical records to the receiving facility and request they be transported with me, if possible. I authorize the performance of emergency medical procedures and treatments upon me in both transit and upon arrival at the receiving facility. Additionally, I authorize the release of any and all medical records to the receiving facility and request they be transported with me, if possible. I understand that the safest mode of transportation during a medical emergency is an ambulance and that the Hospital advocates the use of this mode of transport. Risks of traveling to the receiving facility by car, including absence of medical control, life sustaining equipment, such as oxygen, and medical personnel has been explained to me and I fully understand them. (DIANE CORRECT BOX BELOW)  [  ]  I consent to the stated transfer and to be transported by ambulance/helicopter. [  ]  I consent to the stated transfer, but refuse transportation by ambulance and accept full responsibility for my transportation by car. I understand the risks of non-ambulance transfers and I exonerate the Hospital and its staff from any deterioration in my condition that results from this refusal.    X___________________________________________    DATE  23  TIME________  Signature of patient or legally responsible individual signing on patient behalf           RELATIONSHIP TO PATIENT_________________________          Provider Certification    NAME Arlingtonfreddie Espinoza                                         1940                              MRN [id number]    A medical screening exam was performed on the above named patient.   Based on the examination:    Condition Necessitating Transfer The encounter diagnosis was Ureteral stone with hydronephrosis. Patient Condition: The patient has been stabilized such that within reasonable medical probability, no material deterioration of the patient condition or the condition of the unborn child(carmencita) is likely to result from the transfer    Reason for Transfer: Level of Care needed not available at this facility (Urology)    Transfer Requirements: Facility [place], [place]   · Space available and qualified personnel available for treatment as acknowledged by Cassi Kincaid  · Agreed to accept transfer and to provide appropriate medical treatment as acknowledged by       Dr. Annamarie Alejandre  · Appropriate medical records of the examination and treatment of the patient are provided at the time of transfer   [address] _______  · Transfer will be performed by qualified personnel from    and appropriate transfer equipment as required, including the use of necessary and appropriate life support measures.     Provider Certification: I have examined the patient and explained the following risks and benefits of being transferred/refusing transfer to the patient/family:  General risk, such as traffic hazards, adverse weather conditions, rough terrain or turbulence, possible failure of equipment (including vehicle or aircraft), or consequences of actions of persons outside the control of the transport personnel, Unanticipated needs of medical equipment and personnel during transport, Risk of worsening condition, The possibility of a transport vehicle being unavailable      Based on these reasonable risks and benefits to the patient and/or the unborn child(carmencita), and based upon the information available at the time of the patient’s examination, I certify that the medical benefits reasonably to be expected from the provision of appropriate medical treatments at another medical facility outweigh the increasing risks, if any, to the individual’s medical condition, and in the case of labor to the unborn child, from effecting the transfer.     X____________________________________________ DATE 09/01/23        TIME_______      ORIGINAL - SEND TO MEDICAL RECORDS   COPY - SEND WITH PATIENT DURING TRANSFER

## 2023-09-01 NOTE — ASSESSMENT & PLAN NOTE
· S/p cystoscopy with left ureteroscopy with holmium laser lithotripsy, left retrograde pyelography and left ureteral stent insertion.   · Analgesics and antiemetics PRN  · IV hydration  · Repeat BMP in the AM  · Outpatient follow up with urology for stent removal and results of stone analysis

## 2023-09-01 NOTE — H&P
[facility]  H&P  Name: Devin Hall 81 y.o. male I MRN: [id number]  Unit/Bed#: -01 I Date of Admission: 9/1/2023   Date of Service: 9/1/2023 I Hospital Day: 0      Assessment/Plan   * Hydronephrosis with urinary obstruction due to ureteral calculus  Assessment & Plan  · S/p cystoscopy with left ureteroscopy with holmium laser lithotripsy, left retrograde pyelography and left ureteral stent insertion. · Analgesics and antiemetics PRN  · IV hydration  · Repeat BMP in the AM  · Outpatient follow up with urology for stent removal and results of stone analysis    Acute kidney failure ([address])  Assessment & Plan  · Baseline creatinine appears to 1.0 based on [facility] records  · Unclear etiology but possibly due to hydronephrosis, although this is unilateral  · Check PVRs and utilize retention protocol given his history of BPH  · IV hydration overnight and monitor for response  · BMP in AM    Constipation  Assessment & Plan  · Significant stool burden noted on CT scan  · Bowel regimen ordered    Obesity (BMI 30-39. 9)  Assessment & Plan  · BMI is 30  · Outpatient weight loss counseling when stable    BPH (benign prostatic hyperplasia)  Assessment & Plan  · Few prostatic calcifications noted on CT scan. Prostate gland and seminal vesicles are otherwise unremarkable for age. · Monitor PVRs and utilize retention protocol  · Outpatient follow up with urology       VTE Pharmacologic Prophylaxis: VTE Score: 4 Moderate Risk (Score 3-4) - Pharmacological DVT Prophylaxis Ordered: heparin. Code Status: Level 1 - Full Code     Anticipated Length of Stay: Patient will be admitted on an inpatient basis with an anticipated length of stay of greater than 2 midnights secondary to RIZWAN, hydronephrosis.     Total Time Spent on Date of Encounter in care of patient: 40 minutes This time was spent on one or more of the following: performing physical exam; counseling and coordination of care; obtaining or reviewing history; documenting in the medical record; reviewing/ordering tests, medications or procedures; communicating with other healthcare professionals and discussing with patient's family/caregivers. Chief Complaint: constipation    History of Present Illness:  Liz Blevins is a 81 y.o. male with a PMH of BPH who presents with constipation. He was evaluated in the due to complaints of constipation. He was noted to have an elevated creatinine and had a CT scan of the abdomen performed. This showed left sided hydronephrosis due to a ureteral stone. He was [facility] for urology services and had a cystoscopy today. Due to RIZWAN he will remain in hospital for treatment post op. Review of Systems:  Review of Systems   All other systems reviewed and are negative. Past Medical and Surgical History:   No past medical history on file. Past Surgical History:   Procedure Laterality Date   • FL RETROGRADE PYELOGRAM  9/1/2023   • TONSILLECTOMY      child        Meds/Allergies:  Prior to Admission medications    Medication Sig Start Date End Date Taking? Authorizing Provider   amoxicillin (AMOXIL) 500 mg capsule  4/16/22   Historical Provider, MD   fluticasone (FLONASE) 50 mcg/act nasal spray 2 sprays into each nostril daily  Patient not taking: Reported on 4/22/2022 11/18/21 11/18/22  Historical Provider, MD   latanoprost (XALATAN) 0.005 % ophthalmic solution  3/14/22   Historical Provider, MD     I have reviewed home medications using recent Epic encounter.     Allergies: No Known Allergies    Social History:  Marital Status: Unknown   Occupation: none listed  Patient Pre-hospital Living Situation: Home  Patient Pre-hospital Level of Mobility: walks  Patient Pre-hospital Diet Restrictions: None  Substance Use History:   Social History     Substance and Sexual Activity   Alcohol Use Never     Social History     Tobacco Use   Smoking Status Never   Smokeless Tobacco Never     Social History Substance and Sexual Activity   Drug Use Never       Family History:  Family History   Problem Relation Age of Onset   • Diabetes Mother    • Hypertension Mother        Physical Exam:     Vitals:   Blood Pressure: 137/99 (09/01/23 1611)  Pulse: 85 (09/01/23 1611)  Temperature: 97.8 °F (36.6 °C) (09/01/23 1611)  Respirations: 17 (09/01/23 1611)  SpO2: 94 % (09/01/23 1611)    Physical Exam  Constitutional:       Appearance: Normal appearance. HENT:      Head: Normocephalic and atraumatic. Nose: Nose normal.      Mouth/Throat:      Mouth: Mucous membranes are moist.      Pharynx: Oropharynx is clear. Eyes:      Extraocular Movements: Extraocular movements intact. Cardiovascular:      Rate and Rhythm: Normal rate and regular rhythm. Pulmonary:      Effort: Pulmonary effort is normal.   Abdominal:      General: There is no distension. Palpations: Abdomen is soft. Tenderness: There is no abdominal tenderness. Skin:     General: Skin is warm and dry. Neurological:      General: No focal deficit present. Mental Status: He is alert and oriented to person, place, and time.    Psychiatric:         Mood and Affect: Mood normal.         Behavior: Behavior normal.        Additional Data:     Lab Results:  Results from last 7 days   Lab Units 09/01/23  0033   WBC Thousand/uL 8.31   HEMOGLOBIN g/dL 13.4   HEMATOCRIT % 40.5   PLATELETS Thousands/uL 151   NEUTROS PCT % 75   LYMPHS PCT % 13*   MONOS PCT % 10   EOS PCT % 0     Results from last 7 days   Lab Units 09/01/23  0033   SODIUM mmol/L 136   POTASSIUM mmol/L 4.0   CHLORIDE mmol/L 102   CO2 mmol/L 25   BUN mg/dL 24   CREATININE mg/dL 2.09*   ANION GAP mmol/L 9   CALCIUM mg/dL 9.2   ALBUMIN g/dL 4.2   TOTAL BILIRUBIN mg/dL 1.23*   ALK PHOS U/L 66   ALT U/L 14   AST U/L 32   GLUCOSE RANDOM mg/dL 138                       Lines/Drains:  Invasive Devices     Peripheral Intravenous Line  Duration           Peripheral IV 09/01/23 Right Antecubital <1 day                    Imaging: Personally reviewed the following imaging: abdominal/pelvic CT  No orders to display       EKG and Other Studies Reviewed on Admission:   · EKG: No EKG obtained. ** Please Note: This note has been constructed using a voice recognition system.  **

## 2023-09-01 NOTE — ED PROVIDER NOTES
History  Chief Complaint   Patient presents with   • Constipation     No bm x4 days. +abd pain. Tried otc stool softeners w no relief      81-year-old male with history of BPH presents with constipation. Patient states 4-day history of constipation with inability to have bowel movement or pass gas. 2 days preceding constipation he noted that he had to strain harder than normal to have bowel movements. Attempted Metamucil and prune juice for relief. Attempted hydrocodone for noted left lower quadrant abdominal bloating. States that he feels like he is plugged and unable to move his stools. Associated decreased p.o. intake. No previous history of constipation. Takes daily fiber supplement. Denies fevers, chills, chest pains, shortness of breath, dysuria, frequency, decreased urine output, abdominal pains, nausea, vomiting, previous history of abdominal surgery. Prior to Admission Medications   Prescriptions Last Dose Informant Patient Reported? Taking?   amoxicillin (AMOXIL) 500 mg capsule  Self Yes No   Patient not taking: Reported on 2022    fluticasone (FLONASE) 50 mcg/act nasal spray  Self Yes No   Si sprays into each nostril daily   Patient not taking: Reported on 2022    latanoprost (XALATAN) 0.005 % ophthalmic solution  Self Yes No      Facility-Administered Medications: None       History reviewed. No pertinent past medical history. Past Surgical History:   Procedure Laterality Date   • TONSILLECTOMY      child        Family History   Problem Relation Age of Onset   • Diabetes Mother    • Hypertension Mother      I have reviewed and agree with the history as documented.     E-Cigarette/Vaping   • E-Cigarette Use Never User      E-Cigarette/Vaping Substances     Social History     Tobacco Use   • Smoking status: Never   • Smokeless tobacco: Never   Vaping Use   • Vaping Use: Never used   Substance Use Topics   • Alcohol use: Never   • Drug use: Never        Review of Systems All other systems reviewed and are negative. Physical Exam  ED Triage Vitals [08/31/23 2354]   Temperature Pulse Respirations Blood Pressure SpO2   98.7 °F (37.1 °C) 92 16 (!) 175/81 97 %      Temp Source Heart Rate Source Patient Position - Orthostatic VS BP Location FiO2 (%)   Oral Monitor Sitting Right arm --      Pain Score       --             Orthostatic Vital Signs  Vitals:    09/01/23 0300 09/01/23 0330 09/01/23 0400 09/01/23 0515   BP: 146/68 166/67 (!) 172/73 (!) 195/75   Pulse: 74 72 71 70   Patient Position - Orthostatic VS:           Physical Exam  Vitals and nursing note reviewed. Constitutional:       General: He is not in acute distress. Appearance: Normal appearance. He is normal weight. He is not ill-appearing, toxic-appearing or diaphoretic. HENT:      Head: Normocephalic and atraumatic. Right Ear: External ear normal.      Left Ear: External ear normal.      Nose: Nose normal.      Mouth/Throat:      Mouth: Mucous membranes are moist.      Pharynx: Oropharynx is clear. Eyes:      General: No scleral icterus. Right eye: No discharge. Left eye: No discharge. Extraocular Movements: Extraocular movements intact. Cardiovascular:      Rate and Rhythm: Normal rate and regular rhythm. Pulses: Normal pulses. Heart sounds: Normal heart sounds. No murmur heard. No friction rub. No gallop. Pulmonary:      Effort: Pulmonary effort is normal. No respiratory distress. Breath sounds: Normal breath sounds. No stridor. No wheezing, rhonchi or rales. Chest:      Chest wall: No tenderness. Abdominal:      General: There is no distension. Palpations: Abdomen is soft. There is no mass. Tenderness: There is no abdominal tenderness. There is no right CVA tenderness, left CVA tenderness, guarding or rebound. Hernia: No hernia is present.    Genitourinary:     Rectum: Normal.      Comments: Prostate enlarged  Musculoskeletal: General: No swelling, tenderness, deformity or signs of injury. Normal range of motion. Cervical back: Neck supple. Right lower leg: No edema. Left lower leg: No edema. Skin:     General: Skin is warm and dry. Capillary Refill: Capillary refill takes less than 2 seconds. Coloration: Skin is not cyanotic, jaundiced or pale. Findings: No bruising, erythema, lesion, petechiae or rash. Neurological:      General: No focal deficit present. Mental Status: He is alert and oriented to person, place, and time. Mental status is at baseline. Motor: No weakness.       Gait: Gait normal.   Psychiatric:         Mood and Affect: Mood normal.         Behavior: Behavior normal.         ED Medications  Medications   sodium chloride 0.9 % bolus 1,000 mL (0 mL Intravenous Stopped 9/1/23 0134)   sodium chloride 0.9 % bolus 1,000 mL (0 mL Intravenous Stopped 9/1/23 0508)   tamsulosin (FLOMAX) capsule 0.4 mg (0.4 mg Oral Given 9/1/23 0431)       Diagnostic Studies  Results Reviewed     Procedure Component Value Units Date/Time    UA w Reflex to Microscopic w Reflex to Culture [[id number]]  (Abnormal) Collected: 09/01/23 0407    Lab Status: Final result Specimen: Urine, Clean Catch Updated: 09/01/23 0420     Color, UA Light Yellow     Clarity, UA Clear     Specific Gravity, UA 1.010     pH, UA 5.5     Leukocytes, UA Negative     Nitrite, UA Negative     Protein, UA Negative mg/dl      Glucose, UA Negative mg/dl      Ketones, UA 10 (1+) mg/dl      Urobilinogen, UA <2.0 mg/dl      Bilirubin, UA Negative     Occult Blood, UA Negative    Comprehensive metabolic panel [[id number]]  (Abnormal) Collected: 09/01/23 0033    Lab Status: Final result Specimen: Blood from Arm, Right Updated: 09/01/23 0111     Sodium 136 mmol/L      Potassium 4.0 mmol/L      Chloride 102 mmol/L      CO2 25 mmol/L      ANION GAP 9 mmol/L      BUN 24 mg/dL      Creatinine 2.09 mg/dL      Glucose 138 mg/dL      Calcium 9.2 mg/dL AST 32 U/L      ALT 14 U/L      Alkaline Phosphatase 66 U/L      Total Protein 7.4 g/dL      Albumin 4.2 g/dL      Total Bilirubin 1.23 mg/dL      eGFR 28 ml/min/1.73sq m     Narrative:      National Kidney Disease Foundation guidelines for Chronic Kidney Disease (CKD):   •  Stage 1 with normal or high GFR (GFR > 90 mL/min/1.73 square meters)  •  Stage 2 Mild CKD (GFR = 60-89 mL/min/1.73 square meters)  •  Stage 3A Moderate CKD (GFR = 45-59 mL/min/1.73 square meters)  •  Stage 3B Moderate CKD (GFR = 30-44 mL/min/1.73 square meters)  •  Stage 4 Severe CKD (GFR = 15-29 mL/min/1.73 square meters)  •  Stage 5 End Stage CKD (GFR <15 mL/min/1.73 square meters)  Note: GFR calculation is accurate only with a steady state creatinine    Lipase [[id number]]  (Abnormal) Collected: 09/01/23 0033    Lab Status: Final result Specimen: Blood from Arm, Right Updated: 09/01/23 0111     Lipase <6 u/L     Magnesium [[id number]]  (Normal) Collected: 09/01/23 0033    Lab Status: Final result Specimen: Blood from Arm, Right Updated: 09/01/23 0111     Magnesium 2.1 mg/dL     CBC and differential [[id number]]  (Abnormal) Collected: 09/01/23 0033    Lab Status: Final result Specimen: Blood from Arm, Right Updated: 09/01/23 0039     WBC 8.31 Thousand/uL      RBC 4.46 Million/uL      Hemoglobin 13.4 g/dL      Hematocrit 40.5 %      MCV 91 fL      MCH 30.0 pg      MCHC 33.1 g/dL      RDW 12.5 %      MPV 9.4 fL      Platelets 681 Thousands/uL      nRBC 0 /100 WBCs      Neutrophils Relative 75 %      Immat GRANS % 2 %      Lymphocytes Relative 13 %      Monocytes Relative 10 %      Eosinophils Relative 0 %      Basophils Relative 0 %      Neutrophils Absolute 6.17 Thousands/µL      Immature Grans Absolute 0.20 Thousand/uL      Lymphocytes Absolute 1.04 Thousands/µL      Monocytes Absolute 0.86 Thousand/µL      Eosinophils Absolute 0.01 Thousand/µL      Basophils Absolute 0.03 Thousands/µL                  CT abdomen pelvis wo contrast   Final Result by Sanaz Gonsalez MD (09/01 7913)      Mild left hydronephrosis, hydroureter, and minimal perinephric/periureteral inflammatory stranding is seen secondary to an obstructing 5 mm distal left ureteral stone just above the UVJ region. Other ancillary findings detailed above. Workstation performed: [id number]               Procedures  Procedures      ED Course  ED Course as of 09/01/23 0656   Fri Sep 01, 2023   0116 10/14/22:   Creatinine 0.70 - 1.22 mg/dL 1.02   eGFRcr > OR = 60 mL/min/1.73m2 74        0357 IMPRESSION:     Mild left hydronephrosis, hydroureter, and minimal perinephric/periureteral inflammatory stranding is seen secondary to an obstructing 5 mm distal left ureteral stone just above the UVJ region. Other ancillary findings detailed above.           Workstation performed: [id number]   0400 Reached out to Urology Call Johnny Hodgson regarding patient w/ obstructing ureteral stone w/ RIZWAN and decrease in gfr.    0406 Urology advised transfer to [facility] and stent placement. NPO. Medical Decision Making  81-year-old male presents with acute onset constipation. Minimal stool noted in rectal vault. Differential includes but not limited to volvulus, stricture, diverticulosis/diverticulitis, mass, electrolyte abnormality, dehydration. We will get basic labs and electrolytes, CT AP for further evaluation. Labs notable for RIZWAN and decreased GFR when compared to previous studies at [facility]. CTAP notable for ureteric stone with hydronephrosis and perinephric stranding. Discussed case with urology and plan for transfer to [facility] for stent placement. No S/S of infection or peritoneal signs. Patient given fluids, Flomax and transferred to [facility]. Amount and/or Complexity of Data Reviewed  Labs: ordered. Radiology: ordered. Risk  Prescription drug management.             Disposition  Final diagnoses:   Ureteral stone with hydronephrosis     Time reflects when diagnosis was documented in both MDM as applicable and the Disposition within this note     Time User Action Codes Description Comment    9/1/2023  4:23 AM Kristen Estrada Add [N13.2] Ureteral stone with hydronephrosis       ED Disposition     ED Disposition   Transfer to Another Facility-In Network    Condition   --    Date/Time   Fri Sep 1, 2023  4:10 AM    Comment   Rosario Petr should be transferred out to [facility].            MD Documentation    Abida Eaton Most Recent Value   Patient Condition The patient has been stabilized such that within reasonable medical probability, no material deterioration of the patient condition or the condition of the unborn child(carmencita) is likely to result from the transfer   Reason for Transfer Level of Care needed not available at this facility  [Urology]   Benefits of Transfer Specialized equipment and/or services available at the receiving facility (Include comment)________________________  [Urology]   Risks of Transfer Potential for delay in receiving treatment, Potential deterioration of medical condition, Loss of IV, Increased discomfort during transfer, Possible worsening of condition or death during transfer   Accepting Physician Dr. Pasco Duane Name, [place], [place]    (Name & Tel number) Gabby Montalvo MD   Provider Certification General risk, such as traffic hazards, adverse weather conditions, rough terrain or turbulence, possible failure of equipment (including vehicle or aircraft), or consequences of actions of persons outside the control of the transport personnel, Unanticipated needs of medical equipment and personnel during transport, Risk of worsening condition, The possibility of a transport vehicle being unavailable      RN Documentation    Flowsheet Row Most [facility] Name, [address] [place] [address]    (Name & Tel number) Nicholas Urias      Follow-up Information    None         Patient's Medications   Discharge Prescriptions    No medications on file     No discharge procedures on file. PDMP Review     None           ED Provider  Attending physically available and evaluated Aixa Browntracy. I managed the patient along with the ED Attending.     Electronically Signed by         Jeana Bosch MD  09/01/23 9796

## 2023-09-01 NOTE — ASSESSMENT & PLAN NOTE
· Few prostatic calcifications noted on CT scan. Prostate gland and seminal vesicles are otherwise unremarkable for age.   · Monitor PVRs and utilize retention protocol  · Outpatient follow up with urology

## 2023-09-02 VITALS
TEMPERATURE: 97.3 F | RESPIRATION RATE: 18 BRPM | DIASTOLIC BLOOD PRESSURE: 76 MMHG | HEART RATE: 89 BPM | SYSTOLIC BLOOD PRESSURE: 146 MMHG | OXYGEN SATURATION: 96 %

## 2023-09-02 LAB
ANION GAP SERPL CALCULATED.3IONS-SCNC: 10 MMOL/L
BUN SERPL-MCNC: 19 MG/DL (ref 5–25)
CALCIUM SERPL-MCNC: 8.6 MG/DL (ref 8.4–10.2)
CHLORIDE SERPL-SCNC: 105 MMOL/L (ref 96–108)
CO2 SERPL-SCNC: 26 MMOL/L (ref 21–32)
CREAT SERPL-MCNC: 1.11 MG/DL (ref 0.6–1.3)
GFR SERPL CREATININE-BSD FRML MDRD: 61 ML/MIN/1.73SQ M
GLUCOSE P FAST SERPL-MCNC: 104 MG/DL (ref 65–99)
GLUCOSE SERPL-MCNC: 104 MG/DL (ref 65–140)
POTASSIUM SERPL-SCNC: 4.2 MMOL/L (ref 3.5–5.3)
SODIUM SERPL-SCNC: 141 MMOL/L (ref 135–147)

## 2023-09-02 PROCEDURE — 80048 BASIC METABOLIC PNL TOTAL CA: CPT | Performed by: INTERNAL MEDICINE

## 2023-09-02 PROCEDURE — 99239 HOSP IP/OBS DSCHRG MGMT >30: CPT | Performed by: PHYSICIAN ASSISTANT

## 2023-09-02 PROCEDURE — NC001 PR NO CHARGE: Performed by: PHYSICIAN ASSISTANT

## 2023-09-02 PROCEDURE — 99232 SBSQ HOSP IP/OBS MODERATE 35: CPT | Performed by: UROLOGY

## 2023-09-02 RX ORDER — POLYETHYLENE GLYCOL 3350 17 G/17G
17 POWDER, FOR SOLUTION ORAL DAILY
Qty: 14 EACH | Refills: 0 | Status: SHIPPED | OUTPATIENT
Start: 2023-09-03

## 2023-09-02 RX ORDER — CEPHALEXIN 500 MG/1
500 CAPSULE ORAL EVERY 8 HOURS SCHEDULED
Qty: 18 CAPSULE | Refills: 0 | Status: SHIPPED | OUTPATIENT
Start: 2023-09-02 | End: 2023-09-08

## 2023-09-02 RX ORDER — LATANOPROST 50 UG/ML
1 SOLUTION/ DROPS OPHTHALMIC
Qty: 5 ML | Refills: 0 | Status: SHIPPED | OUTPATIENT
Start: 2023-09-02

## 2023-09-02 RX ORDER — SENNOSIDES 8.6 MG
17.2 TABLET ORAL DAILY
Qty: 30 TABLET | Refills: 0 | Status: SHIPPED | OUTPATIENT
Start: 2023-09-03

## 2023-09-02 RX ORDER — HYDROCODONE BITARTRATE AND ACETAMINOPHEN 5; 325 MG/1; MG/1
1 TABLET ORAL EVERY 4 HOURS PRN
Qty: 15 TABLET | Refills: 0 | Status: SHIPPED | OUTPATIENT
Start: 2023-09-02 | End: 2023-09-12

## 2023-09-02 RX ORDER — ENEMA 19; 7 G/133ML; G/133ML
1 ENEMA RECTAL ONCE AS NEEDED
Status: DISCONTINUED | OUTPATIENT
Start: 2023-09-02 | End: 2023-09-02 | Stop reason: HOSPADM

## 2023-09-02 RX ADMIN — CEPHALEXIN 500 MG: 500 CAPSULE ORAL at 13:57

## 2023-09-02 RX ADMIN — CEPHALEXIN 500 MG: 500 CAPSULE ORAL at 05:57

## 2023-09-02 RX ADMIN — SODIUM CHLORIDE, SODIUM GLUCONATE, SODIUM ACETATE, POTASSIUM CHLORIDE, MAGNESIUM CHLORIDE, SODIUM PHOSPHATE, DIBASIC, AND POTASSIUM PHOSPHATE 75 ML/HR: .53; .5; .37; .037; .03; .012; .00082 INJECTION, SOLUTION INTRAVENOUS at 08:14

## 2023-09-02 RX ADMIN — ACETAMINOPHEN 650 MG: 325 TABLET, FILM COATED ORAL at 13:57

## 2023-09-02 RX ADMIN — HEPARIN SODIUM 5000 UNITS: 5000 INJECTION INTRAVENOUS; SUBCUTANEOUS at 13:58

## 2023-09-02 RX ADMIN — HEPARIN SODIUM 5000 UNITS: 5000 INJECTION INTRAVENOUS; SUBCUTANEOUS at 05:57

## 2023-09-02 RX ADMIN — BISACODYL 10 MG: 10 SUPPOSITORY RECTAL at 08:15

## 2023-09-02 NOTE — PROGRESS NOTES
Progress Note -urology  Li Anderson 81 y.o. male MRN: [id number]  Unit/Bed#: -01 Encounter: [id number]    Assessment & Plan:  81-year-old male with 5 mm distal left ureteral calculus hydronephrosis, now status post cystoscopy ureteroscopy homing laser lithotripsy basket extraction ureteral stent insertion on the left, progressing well    -Patient afebrile and hemodynamically stable  -Patient presenting with RIZWAN on admission of 2, repeat BMP today creatinine back down at 1.11  -Discussed with patient what to expect postoperatively including frequency, urgency, dysuria, flank pain with urination and hematuria as long as ureteral stent is in place and in varying degrees.  -Discussed that he has a stent with a string which may be removed on Monday. Our office will contact him to verify and walk him through the steps should he have any questions.  -He will then follow-up with our office routinely for elevated PSA and nephrolithiasis. -Patient cleared from urologic standpoint as long as renal function begins to trend downwards towards baseline, medically stable and pain controlled. Would recommend short course x3 days antibiotics forced time of stent removal on outpatient. No further  intervention required this admission. Urology will sign off we are always available for additional questions and concerns in regards to this patient. Subjective/Objective   Chief Complaint: None    Subjective:   Patient sitting comfortably in bed no acute distress denies any nausea, vomiting of fevers, chills, flank pain or abdominal pain. Reports hematuria but mild and anticipated    Objective:     Blood pressure 105/76, pulse 84, temperature 97.5 °F (36.4 °C), resp. rate 18, SpO2 95 %. ,There is no height or weight on file to calculate BMI.       Intake/Output Summary (Last 24 hours) at 9/2/2023 2212  Last data filed at 9/2/2023 0047  Gross per 24 hour   Intake --   Output 1100 ml   Net -1100 ml       Invasive Devices Peripheral Intravenous Line  Duration           Peripheral IV 09/01/23 Right Antecubital 1 day            Physical Exam  Constitutional:       General: He is not in acute distress. Appearance: He is normal weight. He is not ill-appearing, toxic-appearing or diaphoretic. HENT:      Head: Normocephalic and atraumatic. Right Ear: External ear normal.      Left Ear: External ear normal.      Nose: Nose normal.      Mouth/Throat:      Pharynx: Oropharynx is clear. Eyes:      General: No scleral icterus. Conjunctiva/sclera: Conjunctivae normal.   Cardiovascular:      Rate and Rhythm: Normal rate and regular rhythm. Pulses: Normal pulses. Heart sounds: No murmur heard. No friction rub. No gallop. Pulmonary:      Effort: Pulmonary effort is normal. No respiratory distress. Breath sounds: No wheezing, rhonchi or rales. Abdominal:      General: Bowel sounds are normal. There is no distension. Tenderness: There is no abdominal tenderness. Genitourinary:     Comments: Dara slightly blood-tinged colored urine in urinal  Musculoskeletal:         General: Normal range of motion. Cervical back: Normal range of motion. Skin:     General: Skin is warm and dry. Neurological:      General: No focal deficit present. Mental Status: He is alert and oriented to person, place, and time. Lab, Imaging and other studies:I have personally reviewed pertinent lab results.    Lab Results   Component Value Date    WBC 8.31 09/01/2023    HGB 13.4 09/01/2023    HCT 40.5 09/01/2023    MCV 91 09/01/2023     09/01/2023     Lab Results   Component Value Date    SODIUM 136 09/01/2023    K 4.0 09/01/2023     09/01/2023    CO2 25 09/01/2023    BUN 24 09/01/2023    CREATININE 2.09 (H) 09/01/2023    GLUC 138 09/01/2023    CALCIUM 9.2 09/01/2023        VTE Pharmacologic Prophylaxis: Heparin  VTE Mechanical Prophylaxis: sequential compression device      Tamanna Carpenter, SADAF

## 2023-09-02 NOTE — ASSESSMENT & PLAN NOTE
· Significant stool burden noted on CT scan  · Bowel regimen ordered  · Still no BM, patient states he has had no BM for 5 days now, requesting enema  · Being given suppository now, will order enema if no relief with suppository.  Discussed with nurse

## 2023-09-02 NOTE — ASSESSMENT & PLAN NOTE
· Baseline creatinine appears to 1.0 based on [facility] records  · Unclear etiology but possibly due to hydronephrosis, although this is unilateral  · Check PVRs and utilize retention protocol given his history of BPH  · IV hydration overnight and monitor for response  · Resolved today, creat 1.11, down from 2.09  · Avoid nephrotoxins

## 2023-09-02 NOTE — DISCHARGE SUMMARY
[facility]  Discharge- Carla Daimitt 1940, 81 y.o. male MRN: [id number]  Unit/Bed#: -Tank Encounter: [id number]  Primary Care Provider: Tierra Haider MD   Date and time admitted to hospital: 9/1/2023 11:39 AM    Obesity (BMI 30-39. 9)  Assessment & Plan  · BMI is 30  · Outpatient weight loss counseling when stable    BPH (benign prostatic hyperplasia)  Assessment & Plan  · Few prostatic calcifications noted on CT scan. Prostate gland and seminal vesicles are otherwise unremarkable for age. · Monitor PVRs and utilize retention protocol  · Outpatient follow up with urology    Constipation  Assessment & Plan  · Significant stool burden noted on CT scan  · Bowel regimen ordered  · Had not had BM for 5 days, did have BM today after suppository  · Ready for discharge home    Acute kidney failure (HCC)  Assessment & Plan  · Baseline creatinine appears to 1.0 based on [facility] records  · Unclear etiology but possibly due to hydronephrosis, although this is unilateral  · Check PVRs and utilize retention protocol given his history of BPH  · IV hydration overnight and monitor for response  · Resolved today, creat 1.11, down from 2.09  · Avoid nephrotoxins    * Hydronephrosis with urinary obstruction due to ureteral calculus  Assessment & Plan  · S/p cystoscopy with left ureteroscopy with holmium laser lithotripsy, left retrograde pyelography and left ureteral stent insertion. · Analgesics and antiemetics PRN  · IV hydration  · Seen by urology this AM who signed off.  Outpatient follow up with urology for stent removal and results of stone analysis        Discharging Physician / Practitioner: Pamela Hopkins PA-C  PCP: Tierra Haider MD  Admission Date:   Discharge Date: 09/02/23    Medical Problems     Resolved Problems  Date Reviewed: 9/2/2023   None         Consultations During Hospital Stay:  · Urology Dr Rafael Dominique on 9/1/23    Procedures Performed:   · Cystoscopy, lithotripsy, stone extraction and stent insertion 9/1/23 by Dr Sergei Hawk Findings / Test Results:   · CT scan- mild left hydronephrosis secondary to obstructing 5mm stone  · Creat 2.09 on admit, normal on day of discharge at 1.11        Test Results Pending at Discharge (will require follow up):   · none     Outpatient Tests Requested:  · none    Complications:  none    Reason for Admission: abdominal pain    Hospital Course:     Aixa Fowler is a 81 y.o. male patient who originally presented to the hospital on 9/1/2023 due to abdominal pain which he attributed to not having a BM for 4 days. He was found to have elevated creat and a left sided kidney stone. He was transferred to the [facility] for urology surgery. On the morning ofPOD#1 he still had not had a BM. He was given a suppository which was effective. He is eager to be discharged home        Please see above list of diagnoses and related plan for additional information. Condition at Discharge: stable     Discharge Day Visit / Exam:     * Please refer to separate progress note for these details *        Discharge instructions/Information to patient and family:   See after visit summary for information provided to patient and family. Provisions for Follow-Up Care:  See after visit summary for information related to follow-up care and any pertinent home health orders. Disposition:     Home        Planned Readmission: no     Discharge Statement:  I spent 30 minutes discharging the patient. This time was spent on the day of discharge. I had direct contact with the patient on the day of discharge. Greater than 50% of the total time was spent examining patient, answering all patient questions, arranging and discussing plan of care with patient as well as directly providing post-discharge instructions. Additional time then spent on discharge activities.     Discharge Medications:  See after visit summary for reconciled discharge medications provided to patient and family.       ** Please Note: This note has been constructed using a voice recognition system **

## 2023-09-02 NOTE — PLAN OF CARE
Problem: PAIN - ADULT  Goal: Verbalizes/displays adequate comfort level or baseline comfort level  Description: Interventions:  - Encourage patient to monitor pain and request assistance  - Assess pain using appropriate pain scale  - Administer analgesics based on type and severity of pain and evaluate response  - Implement non-pharmacological measures as appropriate and evaluate response  - Consider cultural and social influences on pain and pain management  - Notify physician/advanced practitioner if interventions unsuccessful or patient reports new pain  Outcome: Progressing     Problem: INFECTION - ADULT  Goal: Absence or prevention of progression during hospitalization  Description: INTERVENTIONS:  - Assess and monitor for signs and symptoms of infection  - Monitor lab/diagnostic results  - Monitor all insertion sites, i.e. indwelling lines, tubes, and drains  - Monitor endotracheal if appropriate and nasal secretions for changes in amount and color  - [place] appropriate cooling/warming therapies per order  - Administer medications as ordered  - Instruct and encourage patient and family to use good hand hygiene technique  - Identify and instruct in appropriate isolation precautions for identified infection/condition  Outcome: Progressing  Goal: Absence of fever/infection during neutropenic period  Description: INTERVENTIONS:  - Monitor WBC    Outcome: Progressing     Problem: SAFETY ADULT  Goal: Patient will remain free of falls  Description: INTERVENTIONS:  - Educate patient/family on patient safety including physical limitations  - Instruct patient to call for assistance with activity   - Consult OT/PT to assist with strengthening/mobility   - Keep Call bell within reach  - Keep bed low and locked with side rails adjusted as appropriate  - Keep care items and personal belongings within reach  - Initiate and maintain comfort rounds  - Make Fall Risk Sign visible to staff  - Offer Toileting every **2* Hours, in advance of need  - Initiate/Maintain **bed/chair*alarm  - Obtain necessary fall risk management equipment:   - Apply yellow socks and bracelet for high fall risk patients  - Consider moving patient to room near nurses station  Outcome: Progressing  Goal: Maintain or return to baseline ADL function  Description: INTERVENTIONS:  -  Assess patient's ability to carry out ADLs; assess patient's baseline for ADL function and identify physical deficits which impact ability to perform ADLs (bathing, care of mouth/teeth, toileting, grooming, dressing, etc.)  - Assess/evaluate cause of self-care deficits   - Assess range of motion  - Assess patient's mobility; develop plan if impaired  - Assess patient's need for assistive devices and provide as appropriate  - Encourage maximum independence but intervene and supervise when necessary  - Involve family in performance of ADLs  - Assess for home care needs following discharge   - Consider OT consult to assist with ADL evaluation and planning for discharge  - Provide patient education as appropriate  Outcome: Progressing  Goal: Maintains/Returns to pre admission functional level  Description: INTERVENTIONS:  - Perform BMAT or MOVE assessment daily.   - Set and communicate daily mobility goal to care team and patient/family/caregiver. - Collaborate with rehabilitation services on mobility goals if consulted  - Perform Range of Motion **3* times a day. - Reposition patient every **2* hours.   - Dangle patient *3** times a day  - Stand patient *3** times a day  - Ambulate patient *3** times a day  - Out of bed to chair 3* times a day   - Out of bed for meals *3** times a day  - Out of bed for toileting  - Record patient progress and toleration of activity level   Outcome: Progressing     Problem: DISCHARGE PLANNING  Goal: Discharge to home or other facility with appropriate resources  Description: INTERVENTIONS:  - Identify barriers to discharge w/patient and caregiver  - Arrange for needed discharge resources and transportation as appropriate  - Identify discharge learning needs (meds, wound care, etc.)  - Arrange for interpretive services to assist at discharge as needed  - Refer to Case Management Department for coordinating discharge planning if the patient needs post-hospital services based on physician/advanced practitioner order or complex needs related to functional status, cognitive ability, or social support system  Outcome: Progressing     Problem: Knowledge Deficit  Goal: Patient/family/caregiver demonstrates understanding of disease process, treatment plan, medications, and discharge instructions  Description: Complete learning assessment and assess knowledge base.   Interventions:  - Provide teaching at level of understanding  - Provide teaching via preferred learning methods  Outcome: Progressing     Problem: GENITOURINARY - ADULT  Goal: Maintains or returns to baseline urinary function  Description: INTERVENTIONS:  - Assess urinary function  - Encourage oral fluids to ensure adequate hydration if ordered  - Administer IV fluids as ordered to ensure adequate hydration  - Administer ordered medications as needed  - Offer frequent toileting  - Follow urinary retention protocol if ordered  Outcome: Progressing  Goal: Absence of urinary retention  Description: INTERVENTIONS:  - Assess patient’s ability to void and empty bladder  - Monitor I/O  - Bladder scan as needed  - Discuss with physician/AP medications to alleviate retention as needed  - Discuss catheterization for long term situations as appropriate  Outcome: Progressing

## 2023-09-02 NOTE — ASSESSMENT & PLAN NOTE
· Significant stool burden noted on CT scan  · Bowel regimen ordered  · Had not had BM for 5 days, did have BM today after suppository  · Ready for discharge home

## 2023-09-02 NOTE — PLAN OF CARE
Problem: PAIN - ADULT  Goal: Verbalizes/displays adequate comfort level or baseline comfort level  Description: Interventions:  - Encourage patient to monitor pain and request assistance  - Assess pain using appropriate pain scale  - Administer analgesics based on type and severity of pain and evaluate response  - Implement non-pharmacological measures as appropriate and evaluate response  - Consider cultural and social influences on pain and pain management  - Notify physician/advanced practitioner if interventions unsuccessful or patient reports new pain  Outcome: Progressing     Problem: INFECTION - ADULT  Goal: Absence or prevention of progression during hospitalization  Description: INTERVENTIONS:  - Assess and monitor for signs and symptoms of infection  - Monitor lab/diagnostic results  - Monitor all insertion sites, i.e. indwelling lines, tubes, and drains  - Monitor endotracheal if appropriate and nasal secretions for changes in amount and color  - [place] appropriate cooling/warming therapies per order  - Administer medications as ordered  - Instruct and encourage patient and family to use good hand hygiene technique  - Identify and instruct in appropriate isolation precautions for identified infection/condition  Outcome: Progressing  Goal: Absence of fever/infection during neutropenic period  Description: INTERVENTIONS:  - Monitor WBC    Outcome: Progressing     Problem: SAFETY ADULT  Goal: Patient will remain free of falls  Description: INTERVENTIONS:  - Educate patient/family on patient safety including physical limitations  - Instruct patient to call for assistance with activity   - Consult OT/PT to assist with strengthening/mobility   - Keep Call bell within reach  - Keep bed low and locked with side rails adjusted as appropriate  - Keep care items and personal belongings within reach  - Initiate and maintain comfort rounds  - Make Fall Risk Sign visible to staff  - Offer Toileting every 2 Hours, in advance of need  - Initiate/Maintain alarm  - Obtain necessary fall risk management equipment:   - Apply yellow socks and bracelet for high fall risk patients  - Consider moving patient to room near nurses station  Outcome: Progressing  Goal: Maintain or return to baseline ADL function  Description: INTERVENTIONS:  -  Assess patient's ability to carry out ADLs; assess patient's baseline for ADL function and identify physical deficits which impact ability to perform ADLs (bathing, care of mouth/teeth, toileting, grooming, dressing, etc.)  - Assess/evaluate cause of self-care deficits   - Assess range of motion  - Assess patient's mobility; develop plan if impaired  - Assess patient's need for assistive devices and provide as appropriate  - Encourage maximum independence but intervene and supervise when necessary  - Involve family in performance of ADLs  - Assess for home care needs following discharge   - Consider OT consult to assist with ADL evaluation and planning for discharge  - Provide patient education as appropriate  Outcome: Progressing  Goal: Maintains/Returns to pre admission functional level  Description: INTERVENTIONS:  - Perform BMAT or MOVE assessment daily.   - Set and communicate daily mobility goal to care team and patient/family/caregiver. - Collaborate with rehabilitation services on mobility goals if consulted  - Perform Range of Motion 3 times a day. - Reposition patient every 2 hours.   - Dangle patient 3 times a day  - Stand patient 3 times a day  - Ambulate patient 3 times a day  - Out of bed to chair 3 times a day   - Out of bed for meals 3 times a day  - Out of bed for toileting  - Record patient progress and toleration of activity level   Outcome: Progressing     Problem: DISCHARGE PLANNING  Goal: Discharge to home or other facility with appropriate resources  Description: INTERVENTIONS:  - Identify barriers to discharge w/patient and caregiver  - Arrange for needed discharge resources and transportation as appropriate  - Identify discharge learning needs (meds, wound care, etc.)  - Arrange for interpretive services to assist at discharge as needed  - Refer to Case Management Department for coordinating discharge planning if the patient needs post-hospital services based on physician/advanced practitioner order or complex needs related to functional status, cognitive ability, or social support system  Outcome: Progressing     Problem: Knowledge Deficit  Goal: Patient/family/caregiver demonstrates understanding of disease process, treatment plan, medications, and discharge instructions  Description: Complete learning assessment and assess knowledge base.   Interventions:  - Provide teaching at level of understanding  - Provide teaching via preferred learning methods  Outcome: Progressing

## 2023-09-02 NOTE — ASSESSMENT & PLAN NOTE
· S/p cystoscopy with left ureteroscopy with holmium laser lithotripsy, left retrograde pyelography and left ureteral stent insertion. · Analgesics and antiemetics PRN  · IV hydration  · Seen by urology this AM who signed off.  Outpatient follow up with urology for stent removal and results of stone analysis

## 2023-09-02 NOTE — PROGRESS NOTES
[address] [facility]  Progress Note  Name: Tasneem Shaikh  MRN: [id number]  Unit/Bed#: -01 I Date of Admission: 9/1/2023   Date of Service: 9/2/2023 I Hospital Day: 1    Assessment/Plan   Obesity (BMI 30-39. 9)  Assessment & Plan  · BMI is 30  · Outpatient weight loss counseling when stable    BPH (benign prostatic hyperplasia)  Assessment & Plan  · Few prostatic calcifications noted on CT scan. Prostate gland and seminal vesicles are otherwise unremarkable for age. · Monitor PVRs and utilize retention protocol  · Outpatient follow up with urology    Constipation  Assessment & Plan  · Significant stool burden noted on CT scan  · Bowel regimen ordered  · Still no BM, patient states he has had no BM for 5 days now, requesting enema  · Being given suppository now, will order enema if no relief with suppository. Discussed with nurse    Acute kidney failure ([address])  Assessment & Plan  · Baseline creatinine appears to 1.0 based on [facility] records  · Unclear etiology but possibly due to hydronephrosis, although this is unilateral  · Check PVRs and utilize retention protocol given his history of BPH  · IV hydration overnight and monitor for response  · Resolved today, creat 1.11, down from 2.09  · Avoid nephrotoxins    * Hydronephrosis with urinary obstruction due to ureteral calculus  Assessment & Plan  · S/p cystoscopy with left ureteroscopy with holmium laser lithotripsy, left retrograde pyelography and left ureteral stent insertion. · Analgesics and antiemetics PRN  · IV hydration  · Seen by urology this AM who signed off. Outpatient follow up with urology for stent removal and results of stone analysis             VTE Pharmacologic Prophylaxis:   Pharmacologic: Heparin      Patient Centered Rounds: I have performed bedside rounds with nursing staff today.     Discussions with Specialists or Other Care Team Provider: no    Education and Discussions with Family / Patient: patient updated, call to wife [facility] but no answer    Time Spent for Care: 30 minutes. More than 50% of total time spent on counseling and coordination of care as described above. Current Length of Stay: 1 day(s)    Current Patient Status: Outpatient Procedure   Certification Statement: The patient will continue to require additional inpatient hospital stay due to no BM for 5 days with significant stool burdeon on CT scan, continue aggressive bowel regimen    Discharge Plan: home after BM, likely in next 24 hours    Code Status: Level 1 - Full Code      Subjective:   81year old male admitted with abdominal pain. He states he initially went to the ED with concerns about constipation but it was found he had a kidney stone. He states he still has not had a BM for 5 days now. Nurse states he refused the senna and miralax. He is requesting an enema today. He is passing gas. He is tolerating a regular diet. Objective:     Vitals:   Temp (24hrs), Av.9 °F (36.6 °C), Min:97.2 °F (36.2 °C), Max:98.8 °F (37.1 °C)    Temp:  [97.2 °F (36.2 °C)-98.8 °F (37.1 °C)] 97.3 °F (36.3 °C)  HR:  [70-96] 89  Resp:  [12-19] 18  BP: (105-168)/(55-99) 146/76  SpO2:  [92 %-99 %] 96 %  There is no height or weight on file to calculate BMI. Input and Output Summary (last 24 hours): Intake/Output Summary (Last 24 hours) at 2023 0843  Last data filed at 2023 0809  Gross per 24 hour   Intake --   Output 1300 ml   Net -1300 ml       Physical Exam:     Physical Exam  Vitals reviewed. Constitutional:       General: He is not in acute distress. Appearance: He is not ill-appearing or diaphoretic. HENT:      Head: Normocephalic and atraumatic. Nose: Nose normal.      Mouth/Throat:      Pharynx: Oropharynx is clear. Cardiovascular:      Rate and Rhythm: Normal rate. Pulmonary:      Effort: Pulmonary effort is normal. No respiratory distress. Abdominal:      General: Bowel sounds are normal. There is no distension. Palpations: Abdomen is soft. Tenderness: There is no abdominal tenderness. Musculoskeletal:         General: No deformity or signs of injury. Skin:     General: Skin is warm and dry. Findings: No bruising or erythema. Neurological:      Mental Status: He is alert and oriented to person, place, and time. Mental status is at baseline. Psychiatric:         Mood and Affect: Mood normal.         Behavior: Behavior normal.           Additional Data:     Labs:    Results from last 7 days   Lab Units 09/01/23  0033   WBC Thousand/uL 8.31   HEMOGLOBIN g/dL 13.4   HEMATOCRIT % 40.5   PLATELETS Thousands/uL 151   NEUTROS PCT % 75   LYMPHS PCT % 13*   MONOS PCT % 10   EOS PCT % 0     Results from last 7 days   Lab Units 09/02/23  0515 09/01/23  0033   SODIUM mmol/L 141 136   POTASSIUM mmol/L 4.2 4.0   CHLORIDE mmol/L 105 102   CO2 mmol/L 26 25   BUN mg/dL 19 24   CREATININE mg/dL 1.11 2.09*   ANION GAP mmol/L 10 9   CALCIUM mg/dL 8.6 9.2   ALBUMIN g/dL  --  4.2   TOTAL BILIRUBIN mg/dL  --  1.23*   ALK PHOS U/L  --  66   ALT U/L  --  14   AST U/L  --  32   GLUCOSE RANDOM mg/dL 104 138                           * I Have Reviewed All Lab Data Listed Above. * Additional Pertinent Lab Tests Reviewed:  All [facility] Admission Reviewed        Recent Cultures (last 7 days):           Last 24 Hours Medication List:   Current Facility-Administered Medications   Medication Dose Route Frequency Provider Last Rate   • acetaminophen  650 mg Oral Q6H PRN Ywa Romero MD     • bisacodyl  10 mg Rectal Daily PRN Yaw Romero MD     • cephalexin  500 mg Oral [facility] Dg Tate MD     • heparin (porcine)  5,000 Units Subcutaneous [facility] Yaw Romero MD     • HYDROcodone-acetaminophen  1 tablet Oral Q4H PRN Dg Tate MD     • latanoprost  1 drop Both Eyes HS Yaw Romero MD     • multi-electrolyte  75 mL/hr Intravenous Continuous Yaw Romero MD 75 mL/hr (09/02/23 8614)   • ondansetron  4 mg Intravenous Q6H PRN Yakov Coughlin MD     • polyethylene glycol  17 g Oral Daily Yakov Coughlin MD     • senna  2 tablet Oral Daily Yakov Coughlin MD     • sodium phosphate-biphosphate  1 enema Rectal Once PRN Carmela Joy PA-C          Today, Patient Was Seen By: Zoey Llanes PA-C    ** Please Note: Dictation voice to text software may have been used in the creation of this document.  **

## 2023-09-02 NOTE — PLAN OF CARE
Problem: PAIN - ADULT  Goal: Verbalizes/displays adequate comfort level or baseline comfort level  Description: Interventions:  - Encourage patient to monitor pain and request assistance  - Assess pain using appropriate pain scale  - Administer analgesics based on type and severity of pain and evaluate response  - Implement non-pharmacological measures as appropriate and evaluate response  - Consider cultural and social influences on pain and pain management  - Notify physician/advanced practitioner if interventions unsuccessful or patient reports new pain  9/2/2023 1408 by Maren Ni RN  Outcome: Adequate for Discharge  9/2/2023 0719 by Maren Ni RN  Outcome: Progressing     Problem: INFECTION - ADULT  Goal: Absence or prevention of progression during hospitalization  Description: INTERVENTIONS:  - Assess and monitor for signs and symptoms of infection  - Monitor lab/diagnostic results  - Monitor all insertion sites, i.e. indwelling lines, tubes, and drains  - Monitor endotracheal if appropriate and nasal secretions for changes in amount and color  - [place] appropriate cooling/warming therapies per order  - Administer medications as ordered  - Instruct and encourage patient and family to use good hand hygiene technique  - Identify and instruct in appropriate isolation precautions for identified infection/condition  9/2/2023 1408 by Maren Ni RN  Outcome: Adequate for Discharge  9/2/2023 0719 by Maren Ni RN  Outcome: Progressing  Goal: Absence of fever/infection during neutropenic period  Description: INTERVENTIONS:  - Monitor WBC    9/2/2023 1408 by Maren Ni RN  Outcome: Adequate for Discharge  9/2/2023 0719 by Maren Ni RN  Outcome: Progressing     Problem: SAFETY ADULT  Goal: Patient will remain free of falls  Description: INTERVENTIONS:  - Educate patient/family on patient safety including physical limitations  - Instruct patient to call for assistance with activity   - Consult OT/PT to assist with strengthening/mobility   - Keep Call bell within reach  - Keep bed low and locked with side rails adjusted as appropriate  - Keep care items and personal belongings within reach  - Initiate and maintain comfort rounds  - Make Fall Risk Sign visible to staff  - Offer Toileting every *2** Hours, in advance of need  - Initiate/Maintain *bed/chair**alarm  - Obtain necessary fall risk management equipment:   - Apply yellow socks and bracelet for high fall risk patients  - Consider moving patient to room near nurses station  9/2/2023 1408 by Hemanth Dunaway RN  Outcome: Adequate for Discharge  9/2/2023 0719 by Hemanth Dunaway RN  Outcome: Progressing  Goal: Maintain or return to baseline ADL function  Description: INTERVENTIONS:  -  Assess patient's ability to carry out ADLs; assess patient's baseline for ADL function and identify physical deficits which impact ability to perform ADLs (bathing, care of mouth/teeth, toileting, grooming, dressing, etc.)  - Assess/evaluate cause of self-care deficits   - Assess range of motion  - Assess patient's mobility; develop plan if impaired  - Assess patient's need for assistive devices and provide as appropriate  - Encourage maximum independence but intervene and supervise when necessary  - Involve family in performance of ADLs  - Assess for home care needs following discharge   - Consider OT consult to assist with ADL evaluation and planning for discharge  - Provide patient education as appropriate  9/2/2023 1408 by Hemanth Dunaway RN  Outcome: Adequate for Discharge  9/2/2023 0719 by Hemanth Dunaway RN  Outcome: Progressing  Goal: Maintains/Returns to pre admission functional level  Description: INTERVENTIONS:  - Perform BMAT or MOVE assessment daily.   - Set and communicate daily mobility goal to care team and patient/family/caregiver.    - Collaborate with rehabilitation services on mobility goals if consulted  - Perform Range of Motion *3** times a day. - Reposition patient every *2** hours. - Dangle patient *3** times a day  - Stand patient **3* times a day  - Ambulate patient *3** times a day  - Out of bed to chair *3** times a day   - Out of bed for meals *3** times a day  - Out of bed for toileting  - Record patient progress and toleration of activity level   9/2/2023 1408 by Jose Eduardo El RN  Outcome: Adequate for Discharge  9/2/2023 0719 by Jose Eduardo El RN  Outcome: Progressing     Problem: DISCHARGE PLANNING  Goal: Discharge to home or other facility with appropriate resources  Description: INTERVENTIONS:  - Identify barriers to discharge w/patient and caregiver  - Arrange for needed discharge resources and transportation as appropriate  - Identify discharge learning needs (meds, wound care, etc.)  - Arrange for interpretive services to assist at discharge as needed  - Refer to Case Management Department for coordinating discharge planning if the patient needs post-hospital services based on physician/advanced practitioner order or complex needs related to functional status, cognitive ability, or social support system  9/2/2023 1408 by Jose Eduardo El RN  Outcome: Adequate for Discharge  9/2/2023 0719 by Jose Eduardo El RN  Outcome: Progressing     Problem: Knowledge Deficit  Goal: Patient/family/caregiver demonstrates understanding of disease process, treatment plan, medications, and discharge instructions  Description: Complete learning assessment and assess knowledge base.   Interventions:  - Provide teaching at level of understanding  - Provide teaching via preferred learning methods  9/2/2023 1408 by Jose Eduardo El RN  Outcome: Adequate for Discharge  9/2/2023 0719 by Jose Eduardo El RN  Outcome: Progressing     Problem: GENITOURINARY - ADULT  Goal: Maintains or returns to baseline urinary function  Description: INTERVENTIONS:  - Assess urinary function  - Encourage oral fluids to ensure adequate hydration if ordered  - Administer IV fluids as ordered to ensure adequate hydration  - Administer ordered medications as needed  - Offer frequent toileting  - Follow urinary retention protocol if ordered  9/2/2023 1408 by Pio Guevara RN  Outcome: Adequate for Discharge  9/2/2023 0719 by Pio Guevara RN  Outcome: Progressing  Goal: Absence of urinary retention  Description: INTERVENTIONS:  - Assess patient’s ability to void and empty bladder  - Monitor I/O  - Bladder scan as needed  - Discuss with physician/AP medications to alleviate retention as needed  - Discuss catheterization for long term situations as appropriate  9/2/2023 1408 by Pio Guevara RN  Outcome: Adequate for Discharge  9/2/2023 0719 by Pio Guevara RN  Outcome: Progressing

## 2023-09-03 LAB — BACTERIA UR CULT: NORMAL

## 2023-09-05 ENCOUNTER — TELEPHONE (OUTPATIENT)
Dept: UROLOGY | Facility: AMBULATORY SURGERY CENTER | Age: 83
End: 2023-09-05

## 2023-09-05 DIAGNOSIS — N13.2 HYDRONEPHROSIS WITH URINARY OBSTRUCTION DUE TO URETERAL CALCULUS: Primary | ICD-10-CM

## 2023-09-05 NOTE — TELEPHONE ENCOUNTER
Patient returning call. Patient did not remove his stent yesterday. Patient requesting to do this in office because he is not comfortable doing this himself.      Patient requesting a call back at [phone] or [phone]

## 2023-09-05 NOTE — TELEPHONE ENCOUNTER
Post Op Note    Michael Espinoza is a 81 y.o. male s/p Left - CYSTOSCOPY URETEROSCOPY WITH LITHOTRIPSY HOLMIUM LASER. RETROGRADE PYELOGRAM . BAKET STONE EXTRACTION AND INSERTION STENT URETERAL performed 9/1/2023. Michael Espinoza is a patient of Dr. Deidra Galicia and is seen at the [facility] office. LM for patient seeing how he is doing after his procedure the other day. Advised to call [facility] as we need to confirm stent was removed yesterday. Patient will need KUB in 8-12 weeks. Order placed.

## 2023-09-05 NOTE — TELEPHONE ENCOUNTER
Post Op Note    Aixa Fowler is a 81 y.o. male s/p Left - CYSTOSCOPY URETEROSCOPY WITH LITHOTRIPSY HOLMIUM LASER. RETROGRADE PYELOGRAM . BAKET STONE EXTRACTION AND INSERTION STENT URETERAL performed 9/1/2023. Aixa Fowler is a patient of Dr. Suki Walker and is seen at the [facility] office. How would you rate your pain on a scale from 1 to 10, 10 being the worst pain ever? 0  Have you had a fever? No  Have your bowel movements been regular? Yes  Do you have any difficulty urinating? No  Do you have any other questions or concerns that I can address at this time? None    Patient is doing well overall with pain when he urinates. Informed that this is normal and reviewed stent expectations. He is not comfortable removing stent at home and wishes to come to [facility] for removal. Scheduled appt and discussed f/u afterwards. He already has a yearly appt scheduled. Will use as stone f/u as well.

## 2023-09-06 ENCOUNTER — PROCEDURE VISIT (OUTPATIENT)
Dept: UROLOGY | Facility: CLINIC | Age: 83
End: 2023-09-06

## 2023-09-06 DIAGNOSIS — N13.2 HYDRONEPHROSIS WITH URINARY OBSTRUCTION DUE TO URETERAL CALCULUS: Primary | ICD-10-CM

## 2023-09-06 PROCEDURE — 99024 POSTOP FOLLOW-UP VISIT: CPT

## 2023-09-06 NOTE — PROGRESS NOTES
9/6/2023  Giulia Boston is a 81 y.o. male  [id number]        Diagnosis      Patient is s/p left ureteroscopy with stone extraction on 9/1/23 with Dr. Rolando Parekh  Patient will follow up as scheduled with imaging prior       Procedure Stent with String Removal    There were no vitals filed for this visit. Stent with string removed intact without difficulty. Reviewed post stent removal symptoms including flank pain, dysuria, and hematuria. Instructed patient to increase oral fluid intake. Encouraged the use of NSAIDS and other prescribed pain medication as needed for discomfort. Patient instructed to call the office or report to the ER for uncontrolled pain, fever, chills, nausea or vomiting.        Alie Bundy RN

## 2023-09-08 NOTE — TELEPHONE ENCOUNTER
Called and reviewed KUB should be done 1-2 weeks prior to appointment. Nothing else needed. Patient verbalized understanding.

## 2023-09-08 NOTE — TELEPHONE ENCOUNTER
Patient calling in questioning when he should have his KUB done and if there is anything else that needs to be done at this time or prior to his upcoming visit in October. Please review and call patient.      SEBASTIAN: [id number] [place]   [phone] cell

## 2023-09-11 LAB
COLOR STONE: NORMAL
COM MFR STONE: 100 %
COMMENT-STONE3: NORMAL
COMPOSITION: NORMAL
LABORATORY COMMENT REPORT: NORMAL
PHOTO: NORMAL
SIZE STONE: NORMAL MM
SPEC SOURCE SUBJ: NORMAL
STONE ANALYSIS-IMP: NORMAL
STONE ANALYSIS-IMP: NORMAL
WT STONE: 30 MG

## 2023-09-12 VITALS
HEART RATE: 68 BPM | DIASTOLIC BLOOD PRESSURE: 82 MMHG | TEMPERATURE: 97.5 F | RESPIRATION RATE: 18 BRPM | OXYGEN SATURATION: 96 % | SYSTOLIC BLOOD PRESSURE: 148 MMHG

## 2023-09-15 NOTE — TELEPHONE ENCOUNTER
Pt had his stent removed and he is still having frequent urination. He wakes up every two hours at night and not getting any rest. Is this common.      Pt can be reached at [phone]

## 2023-09-15 NOTE — TELEPHONE ENCOUNTER
Returned call to patient reports still having a lot of frequency especially at night. Pt had stent removed on 09/06/23. Reports good hydration, no blood in urine, no fever or chills. Pt is concerned.  Wants to know if there is anything else he should be doing

## 2023-09-20 ENCOUNTER — HOSPITAL ENCOUNTER (OUTPATIENT)
Dept: ULTRASOUND IMAGING | Facility: HOSPITAL | Age: 83
Discharge: HOME | End: 2023-09-20
Payer: COMMERCIAL

## 2023-09-20 DIAGNOSIS — N13.2 HYDRONEPHROSIS WITH URINARY OBSTRUCTION DUE TO URETERAL CALCULUS: ICD-10-CM

## 2023-09-20 DIAGNOSIS — R10.9 FLANK PAIN: ICD-10-CM

## 2023-09-20 PROCEDURE — 76775 US EXAM ABDO BACK WALL LIM: CPT

## 2023-10-13 ENCOUNTER — HOSPITAL ENCOUNTER (OUTPATIENT)
Dept: RADIOLOGY | Facility: HOSPITAL | Age: 83
Discharge: HOME | End: 2023-10-13
Payer: COMMERCIAL

## 2023-10-13 DIAGNOSIS — N13.2 HYDRONEPHROSIS WITH URINARY OBSTRUCTION DUE TO URETERAL CALCULUS: ICD-10-CM

## 2023-10-13 PROCEDURE — 74018 RADEX ABDOMEN 1 VIEW: CPT

## 2023-10-21 LAB — PSA SERPL-MCNC: 6.69 NG/ML

## 2023-10-27 ENCOUNTER — OFFICE VISIT (OUTPATIENT)
Dept: UROLOGY | Facility: CLINIC | Age: 83
End: 2023-10-27
Payer: COMMERCIAL

## 2023-10-27 VITALS
BODY MASS INDEX: 30.36 KG/M2 | DIASTOLIC BLOOD PRESSURE: 80 MMHG | SYSTOLIC BLOOD PRESSURE: 130 MMHG | OXYGEN SATURATION: 98 % | HEIGHT: 69 IN | WEIGHT: 205 LBS | HEART RATE: 80 BPM

## 2023-10-27 DIAGNOSIS — R97.20 ELEVATED PSA: Primary | ICD-10-CM

## 2023-10-27 DIAGNOSIS — M19.90 ARTHRITIS: ICD-10-CM

## 2023-10-27 PROCEDURE — 99213 OFFICE O/P EST LOW 20 MIN: CPT | Performed by: PHYSICIAN ASSISTANT

## 2023-10-27 NOTE — PROGRESS NOTES
1. Elevated PSA  PSA Total, Diagnostic    PSA Total, Diagnostic      2. Arthritis  Ambulatory Referral to Orthopedic Surgery            Assessment and plan:     1. Elevated PSA  - RASHID mildly firm without overt nodularity  - PSA was remains stable and his age-appropriate  - will return to clinic in 1 year with a repeat PSA prior. Patient has some trepidation and wishes to repeat his PSA in 6 months which is reasonable. Assuming his PSA remains stable over the next year routine screening can be discontinued thereafter    2. Nephrolithiasis  - calcium oxalate  - proper hydration and dietary modifications  - f/u imaging negative for residual stones nor obstructions. [address] [facility]      Chief Complaint     Elevated PSA    History of Present Illness     Yeison Barrera is a 81 y.o. male presenting today for follow up. Reports he was having routine screening by PCP. PSA trend:  5.2 (8/26/21)  5.82 (11/30/21)  5.33 (4/26/22)  5.81 (2/24/23)  6.83 (6/21/23)  6.69 (10/20/23)    CT (9/1/23) mild left hydronephrosis d/t 5mm distal left ureteral stone. S/p left ureteroscopy 9/1/23 with Dr. Sunshine Nurse. F/u renal US (9/20/23) negative for residual obstruction. Prostatomegaly present (approx 60g). Stone analysis 100% calcium oxalate in origin. Reports some mild progressive LUTS with weakened stream. Denies dysuria, gross hematuria, urinary infections, urinary incontinence. Off of pharmacotherapy for his prostate or bladder. Denies any previous history of  surgical manipulation. Review of Systems     Review of Systems   Constitutional:  Negative for activity change, appetite change, chills, diaphoresis, fatigue, fever and unexpected weight change. Respiratory:  Negative for chest tightness and shortness of breath. Cardiovascular:  Negative for chest pain, palpitations and leg swelling.    Gastrointestinal:  Negative for abdominal distention, abdominal pain, constipation, diarrhea, nausea and vomiting. Genitourinary:  Positive for decreased urine volume (weakened stream). Negative for difficulty urinating, dysuria, enuresis, flank pain, frequency, genital sores, hematuria and urgency. Musculoskeletal:  Negative for back pain, gait problem and myalgias. Skin:  Negative for color change, pallor, rash and wound. Psychiatric/Behavioral:  Negative for behavioral problems. The patient is not nervous/anxious. Allergies     No Known Allergies    Physical Exam     Physical Exam  Constitutional:       General: He is not in acute distress. Appearance: Normal appearance. He is normal weight. He is not ill-appearing, toxic-appearing or diaphoretic. HENT:      Head: Normocephalic and atraumatic. Eyes:      General:         Right eye: No discharge. Left eye: No discharge. Conjunctiva/sclera: Conjunctivae normal.   Pulmonary:      Effort: Pulmonary effort is normal. No respiratory distress. Genitourinary:     Comments: Good rectal tone. Prostate 50g, moderately firm without any overt nodularities  Musculoskeletal:         General: No swelling or tenderness. Normal range of motion. Skin:     General: Skin is warm and dry. Coloration: Skin is not jaundiced or pale. Neurological:      General: No focal deficit present. Mental Status: He is alert and oriented to person, place, and time.    Psychiatric:         Mood and Affect: Mood normal.         Behavior: Behavior normal.         Vital Signs     Vitals:    10/27/23 1401   BP: 130/80   BP Location: Left arm   Patient Position: Sitting   Cuff Size: Adult   Pulse: 80   SpO2: 98%   Weight: 93 kg (205 lb)   Height: 5' 9" (1.753 m)         Current Medications       Current Outpatient Medications:     latanoprost (XALATAN) 0.005 % ophthalmic solution, , Disp: , Rfl:     latanoprost (XALATAN) 0.005 % ophthalmic solution, Administer 1 drop to both eyes daily at bedtime, Disp: 5 mL, Rfl: 0    polyethylene glycol (MIRALAX) 17 g packet, Take 17 g by mouth daily Do not start before September 3, 2023., Disp: 14 each, Rfl: 0    senna (SENOKOT) 8.6 mg, Take 2 tablets (17.2 mg total) by mouth daily Do not start before September 3, 2023., Disp: 30 tablet, Rfl: 0      Active Problems     Patient Active Problem List   Diagnosis    Hydronephrosis with urinary obstruction due to ureteral calculus    Acute kidney failure (HCC)    Constipation    BPH (benign prostatic hyperplasia)    Obesity (BMI 30-39. 9)           Past Medical History     History reviewed. No pertinent past medical history.       Surgical History     Past Surgical History:   Procedure Laterality Date    FL RETROGRADE PYELOGRAM  9/1/2023    UT CYSTO/URETERO W/LITHOTRIPSY &INDWELL STENT INSRT Left 9/1/2023    Procedure: CYSTOSCOPY URETEROSCOPY WITH LITHOTRIPSY HOLMIUM LASER, RETROGRADE PYELOGRAM , BAKET STONE EXTRACTION AND INSERTION STENT URETERAL;  Surgeon: Day Reina MD;  Location: [facility];  Service: Urology    TONSILLECTOMY      child          Family History     Family History   Problem Relation Age of Onset    Diabetes Mother     Hypertension Mother          Social History     Social History       Radiology

## 2023-12-06 ENCOUNTER — NURSE TRIAGE (OUTPATIENT)
Age: 83
End: 2023-12-06

## 2023-12-06 DIAGNOSIS — R10.9 FLANK PAIN: Primary | ICD-10-CM

## 2023-12-06 NOTE — TELEPHONE ENCOUNTER
Spoke with pt , culture and micro orders placed, pt will give sample at lab. ER precautions reviewed. Office to monitor for results.

## 2023-12-06 NOTE — TELEPHONE ENCOUNTER
Pt of Sourav Trevizo last seen in [facility] on 10/27/23 for elevated PSA is having a lot of abdominal pressure. He stated it started two days ago and it comes and goes. He is drinking plenty of fluids and having regular bowel movements. Pt is concerned about previous kidney stone and would like to be proactive. Pt would like to know if there is any imaging or labs to be done in the mean time. Advised to drink plenty of fluids and monitor for worsening symptoms. ER precautions reviewed. Answer Assessment - Initial Assessment Questions  1. LOCATION: "Where does it hurt?"       Bladder area   2. RADIATION: "Does the pain shoot anywhere else?" (e.g., chest, back)      no  3. ONSET: "When did the pain begin?" (Minutes, hours or days ago)       Two days ago  4. SUDDEN: "Gradual or sudden onset?"      sudden  5. PATTERN "Does the pain come and go, or is it constant?"     - If constant: "Is it getting better, staying the same, or worsening?"       (Note: Constant means the pain never goes away completely; most serious pain is constant and it progresses)      - If intermittent: "How long does it last?" "Do you have pain now?"      (Note: Intermittent means the pain goes away completely between bouts)      Comes and goes   6. SEVERITY: "How bad is the pain?"  (e.g., Scale 1-10; mild, moderate, or severe)     - MILD (1-3): doesn't interfere with normal activities, abdomen soft and not tender to touch      - MODERATE (4-7): interferes with normal activities or awakens from sleep, tender to touch      - SEVERE (8-10): excruciating pain, doubled over, unable to do any normal activities        moderate  8. CAUSE: "What do you think is causing the stomach pain?"      unsure  9. RELIEVING/AGGRAVATING FACTORS: "What makes it better or worse?" (e.g., movement, antacids, bowel movement)      Movement such as bending and sitting make it worse   10.  OTHER SYMPTOMS: "Has there been any vomiting, diarrhea, constipation, or urine problems?"        no    Protocols used: Abdominal Pain - Male-ADULT-AH

## 2023-12-06 NOTE — TELEPHONE ENCOUNTER
Patient had recent KUB and US that were negative for nephrolithiasis. Recommend starting with urine testing and conservative measures. If continues or worsens, then can have CT performed. ER precautions.  Thanks

## 2023-12-07 ENCOUNTER — APPOINTMENT (OUTPATIENT)
Dept: LAB | Facility: CLINIC | Age: 83
End: 2023-12-07
Payer: COMMERCIAL

## 2023-12-07 LAB
BACTERIA UR QL AUTO: NORMAL /HPF
BILIRUB UR QL STRIP: NEGATIVE
CLARITY UR: CLEAR
COLOR UR: NORMAL
GLUCOSE UR STRIP-MCNC: NEGATIVE MG/DL
HGB UR QL STRIP.AUTO: NEGATIVE
KETONES UR STRIP-MCNC: NEGATIVE MG/DL
LEUKOCYTE ESTERASE UR QL STRIP: NEGATIVE
NITRITE UR QL STRIP: NEGATIVE
NON-SQ EPI CELLS URNS QL MICRO: NORMAL /HPF
PH UR STRIP.AUTO: 5 [PH]
PROT UR STRIP-MCNC: NEGATIVE MG/DL
RBC #/AREA URNS AUTO: NORMAL /HPF
SP GR UR STRIP.AUTO: 1.01 (ref 1–1.03)
UROBILINOGEN UR STRIP-ACNC: <2 MG/DL
WBC #/AREA URNS AUTO: NORMAL /HPF

## 2023-12-07 PROCEDURE — 87086 URINE CULTURE/COLONY COUNT: CPT

## 2023-12-07 PROCEDURE — 81001 URINALYSIS AUTO W/SCOPE: CPT

## 2023-12-07 PROCEDURE — 87147 CULTURE TYPE IMMUNOLOGIC: CPT

## 2023-12-08 LAB — BACTERIA UR CULT: ABNORMAL

## 2023-12-11 DIAGNOSIS — N39.0 URINARY TRACT INFECTION WITHOUT HEMATURIA, SITE UNSPECIFIED: Primary | ICD-10-CM

## 2023-12-11 RX ORDER — AMOXICILLIN AND CLAVULANATE POTASSIUM 875; 125 MG/1; MG/1
1 TABLET, FILM COATED ORAL EVERY 12 HOURS SCHEDULED
Qty: 14 TABLET | Refills: 0 | Status: SHIPPED | OUTPATIENT
Start: 2023-12-11 | End: 2023-12-18

## 2023-12-11 NOTE — TELEPHONE ENCOUNTER
Urine testing negative for blood. UC showing small amount of bacteria. Typically, not treated, however, due to symptoms, I sent over antibiotic to try. ER precautions.  Thanks

## 2023-12-11 NOTE — TELEPHONE ENCOUNTER
Patient advised of medication sent and directions for taking. Confirmed pharmacy sent to.  Patient verbalized understanding and knows to call office with any further concerns

## 2024-03-13 ENCOUNTER — APPOINTMENT (OUTPATIENT)
Dept: LAB | Facility: CLINIC | Age: 84
End: 2024-03-13
Payer: COMMERCIAL

## 2024-03-13 ENCOUNTER — NURSE TRIAGE (OUTPATIENT)
Age: 84
End: 2024-03-13

## 2024-03-13 DIAGNOSIS — R39.9 UTI SYMPTOMS: Primary | ICD-10-CM

## 2024-03-13 DIAGNOSIS — R39.9 UTI SYMPTOMS: ICD-10-CM

## 2024-03-13 LAB
BACTERIA UR QL AUTO: ABNORMAL /HPF
BILIRUB UR QL STRIP: NEGATIVE
CLARITY UR: CLEAR
COLOR UR: YELLOW
GLUCOSE UR STRIP-MCNC: NEGATIVE MG/DL
HGB UR QL STRIP.AUTO: NEGATIVE
KETONES UR STRIP-MCNC: NEGATIVE MG/DL
LEUKOCYTE ESTERASE UR QL STRIP: NEGATIVE
MUCOUS THREADS UR QL AUTO: ABNORMAL
NITRITE UR QL STRIP: NEGATIVE
NON-SQ EPI CELLS URNS QL MICRO: ABNORMAL /HPF
PH UR STRIP.AUTO: 5.5 [PH]
PROT UR STRIP-MCNC: ABNORMAL MG/DL
RBC #/AREA URNS AUTO: ABNORMAL /HPF
SP GR UR STRIP.AUTO: 1.03 (ref 1–1.03)
UROBILINOGEN UR STRIP-ACNC: <2 MG/DL
WBC #/AREA URNS AUTO: ABNORMAL /HPF

## 2024-03-13 PROCEDURE — 81001 URINALYSIS AUTO W/SCOPE: CPT

## 2024-03-13 PROCEDURE — 87086 URINE CULTURE/COLONY COUNT: CPT

## 2024-03-13 PROCEDURE — 87077 CULTURE AEROBIC IDENTIFY: CPT

## 2024-03-13 NOTE — TELEPHONE ENCOUNTER
Pt reports over weekend burning w/urination and urine urgency started.    Denies pain, nausea, vomiting, fevers,hematuria at this time.      Reviewed ED precautions and ordered urine testing.     Additional Information   Negative: The patient has severe uncontrolled pain   Negative: The patient has a fever of 101 or higher   Negative: The patient has persistent vomiting    Answer Questions - Initial Assessment  When did your symptoms start: over weekend     Is burning with every void: yes    Do you have any other urinary symptoms, urinary frequency, urgency, incontinence: urine urgency     Have you become unable to urinate: No: I do not feel as though I am retaining urine    Have you seen blood in your urine: no    Do you have any abdominal pain or flank pain: yes- left side     Do you have a fever of 101 or higher: no    Do you have a history of urinary tract infections: Yes:     Have you had any recent urine testing: no    Protocols used: Dysuria

## 2024-03-14 ENCOUNTER — TELEPHONE (OUTPATIENT)
Dept: UROLOGY | Facility: CLINIC | Age: 84
End: 2024-03-14

## 2024-03-14 DIAGNOSIS — R39.9 UTI SYMPTOMS: Primary | ICD-10-CM

## 2024-03-14 LAB — BACTERIA UR CULT: ABNORMAL

## 2024-03-14 RX ORDER — SULFAMETHOXAZOLE AND TRIMETHOPRIM 800; 160 MG/1; MG/1
1 TABLET ORAL EVERY 12 HOURS SCHEDULED
Qty: 14 TABLET | Refills: 0 | Status: SHIPPED | OUTPATIENT
Start: 2024-03-14 | End: 2024-03-21

## 2024-03-14 NOTE — TELEPHONE ENCOUNTER
Spoke with pt relaying Josefina TALAVERA message, abx sent to pharmacy     Pt verbalized understanding and wanted to know what abx was sent to the pharmacy. I informed pt BACTRIM was sent and pt stated he's had a bad experience with KEFLEX made his arthritis worse.     Pt wants to know if he can AUGMENTIN or AMOXICILLIN instead.     Please advise     ----- Message from Josefina Yee PA-C sent at 3/14/2024  3:25 PM EDT -----  antibiotics sent

## 2024-03-15 NOTE — TELEPHONE ENCOUNTER
LVM relaying [facility] PA-C   Culture grew proteus. Cipro or Bactrim is recommended. Bactrim and keflex are two different classes of antibiotics.   Provided office number

## 2024-03-18 DIAGNOSIS — N39.0 URINARY TRACT INFECTION WITHOUT HEMATURIA, SITE UNSPECIFIED: Primary | ICD-10-CM

## 2024-03-18 RX ORDER — AMOXICILLIN AND CLAVULANATE POTASSIUM 500; 125 MG/1; MG/1
1 TABLET, FILM COATED ORAL EVERY 12 HOURS SCHEDULED
Qty: 10 TABLET | Refills: 0 | Status: SHIPPED | OUTPATIENT
Start: 2024-03-18 | End: 2024-03-23

## 2024-03-18 NOTE — TELEPHONE ENCOUNTER
Patient called and stated he took the Bactrim as prescribed on Friday 3/15. States the med made him feel lightheaded and gave him an irregular heart beat. Patient did not take it anymore after that one dose on Friday. Patient also looked up the med and said he saw that it can cause kidney stones, and should be used with caution in the elderly. Patient does not wish to be on the Bactrim and would like something else prescribed. Patient requesting Augmentin. Did explain to patient that antibiotic would be prescribed based on what the bacteria is susceptible to. Please advise.     CB# [phone], cell- [phone]  Pharmacy [facility] in [facility]

## 2024-03-18 NOTE — TELEPHONE ENCOUNTER
Spoke with Joel Millard and advised. Pt verbalized understanding.  Pt would like it stated in chart that he does not want to take Bactrim.

## 2024-03-27 ENCOUNTER — OFFICE VISIT (OUTPATIENT)
Dept: DERMATOLOGY | Facility: CLINIC | Age: 84
End: 2024-03-27

## 2024-03-27 VITALS — WEIGHT: 205 LBS | HEIGHT: 69 IN | BODY MASS INDEX: 30.36 KG/M2 | TEMPERATURE: 96.9 F

## 2024-03-27 DIAGNOSIS — D48.5 NEOPLASM OF UNCERTAIN BEHAVIOR OF SKIN: Primary | ICD-10-CM

## 2024-03-27 PROCEDURE — 88341 IMHCHEM/IMCYTCHM EA ADD ANTB: CPT | Performed by: STUDENT IN AN ORGANIZED HEALTH CARE EDUCATION/TRAINING PROGRAM

## 2024-03-27 PROCEDURE — 88342 IMHCHEM/IMCYTCHM 1ST ANTB: CPT | Performed by: STUDENT IN AN ORGANIZED HEALTH CARE EDUCATION/TRAINING PROGRAM

## 2024-03-27 PROCEDURE — 88305 TISSUE EXAM BY PATHOLOGIST: CPT | Performed by: STUDENT IN AN ORGANIZED HEALTH CARE EDUCATION/TRAINING PROGRAM

## 2024-03-27 NOTE — PROGRESS NOTES
"[facility] Dermatology Clinic Note     Patient Name: Joel Millard  Encounter Date: 03/27/24     Have you been cared for by a [facility] Dermatologist in the last 3 years and, if so, which description applies to you?    NO.   I am considered a \"new\" patient and must complete all patient intake questions. I am MALE/not capable of bearing children.    REVIEW OF SYSTEMS:  Have you recently had or currently have any of the following? Recent fever or chills? No  Any non-healing wound? No   PAST MEDICAL HISTORY:  Have you personally ever had or currently have any of the following?  If \"YES,\" then please provide more detail. Skin cancer (such as Melanoma, Basal Cell Carcinoma, Squamous Cell Carcinoma?  No  Tuberculosis, HIV/AIDS, Hepatitis B or C: No  Radiation Treatment No   HISTORY OF IMMUNOSUPPRESSION:   Do you have a history of any of the following:  Systemic Immunosuppression such as Diabetes, Biologic or Immunotherapy, Chemotherapy, Organ Transplantation, Bone Marrow Transplantation?  No     Answering \"YES\" requires the addition of the dotphrase \"IMMUNOSUPPRESSED\" as the first diagnosis of the patient's visit.   FAMILY HISTORY:  Any \"first degree relatives\" (parent, brother, sister, or child) with the following?    Skin Cancer, Pancreatic or Other Cancer? No   PATIENT EXPERIENCE:    Do you want the Dermatologist to perform a COMPLETE skin exam today including a clinical examination under the \"bra and underwear\" areas?  NO  If necessary, do we have your permission to call and leave a detailed message on your Preferred Phone number that includes your specific medical information?  Yes      No Known Allergies   Current Outpatient Medications:     latanoprost (XALATAN) 0.005 % ophthalmic solution, , Disp: , Rfl:     latanoprost (XALATAN) 0.005 % ophthalmic solution, Administer 1 drop to both eyes daily at bedtime, Disp: 5 mL, Rfl: 0    polyethylene glycol (MIRALAX) 17 g packet, Take 17 g by mouth daily Do not start " "before September 3, 2023., Disp: 14 each, Rfl: 0    senna (SENOKOT) 8.6 mg, Take 2 tablets (17.2 mg total) by mouth daily Do not start before September 3, 2023., Disp: 30 tablet, Rfl: 0          Whom besides the patient is providing clinical information about today's encounter?   NO ADDITIONAL HISTORIAN (patient alone provided history)    Physical Exam and Assessment/Plan by Diagnosis:        NEOPLASM OF UNCERTAIN BEHAVIOR OF SKIN    Physical Exam:  (Anatomic Location); (Size and Morphological Description); (Differential Diagnosis):  Specimen A;left upper back;3.5 ccm x 2.5 eroded plaque ;Ddx likely BCC    Pertinent Positives:  Pertinent Negatives:    Additional History of Present Condition:  Patient presents today for SOC on back that bleeds,has not healed for 2 years,scabs and crust over ,very itchy,    Assessment and Plan:  I have discussed with the patient that a sample of skin via a \"skin biopsy” would be potentially helpful to further make a specific diagnosis under the microscope.  Based on a thorough discussion of this condition and the management approach to it (including a comprehensive discussion of the known risks, side effects and potential benefits of treatment), the patient (family) agrees to implement the following specific plan:    Procedure:  Skin Biopsy.  After a thorough discussion of treatment options and risk/benefits/alternatives (including but not limited to local pain, scarring, dyspigmentation, blistering, possible superinfection, and inability to confirm a diagnosis via histopathology), verbal and written consent were obtained and portion of the rash was biopsied for tissue sample.  See below for consent that was obtained from patient and subsequent Procedure Note.   PROCEDURE TANGENTIAL (SHAVE) BIOPSY NOTE:    Performing Physician:   Anatomic Location; Clinical Description with size (cm); Pre-Op Diagnosis:   Specimen A;left upper back;3.5 ccm x 2.5 eroded plaque ;Ddx likely " "BCC  Post-op diagnosis: Same     Local anesthesia: 3:1 1% xylocaine with epi and 1-100,000 buffered     Topical anesthesia: None    Hemostasis: Aluminum chloride       After obtaining informed consent  at which time there was a discussion about the purpose of biopsy  and low risks of infection and bleeding.  The area was prepped and draped in the usual fashion. Anesthesia was obtained with 1% lidocaine with epinephrine. A shave biopsy to an appropriate sampling depth was obtained by Shave (Dermablade or 15 blade) The resulting wound was covered with surgical ointment and bandaged appropriately.     The patient tolerated the procedure well without complications and was without signs of functional compromise.      Specimen has been sent for review by Dermatopathology.    Standard post-procedure care has been explained and has been included in written form within the patient's copy of Informed Consent.    INFORMED CONSENT DISCUSSION AND POST-OPERATIVE INSTRUCTIONS FOR PATIENT    I.  RATIONALE FOR PROCEDURE  I understand that a skin biopsy allows the Dermatologist to test a lesion or rash under the microscope to obtain a diagnosis.  It usually involves numbing the area with numbing medication and removing a small piece of skin; sometimes the area will be closed with sutures. In this specific procedure, sutures are not usually needed.  If any sutures are placed, then they are usually need to be removed in 2 weeks or less.    I understand that my Dermatologist recommends that a skin \"shave\" biopsy be performed today.  A local anesthetic, similar to the kind that a dentist uses when filling a cavity, will be injected with a very small needle into the skin area to be sampled.  The injected skin and tissue underneath \"will go to sleep” and become numb so no pain should be felt afterwards.  An instrument shaped like a tiny \"razor blade\" (shave biopsy instrument) will be used to cut a small piece of tissue and skin from the " "area so that a sample of tissue can be taken and examined more closely under the microscope.  A slight amount of bleeding will occur, but it will be stopped with direct pressure and a pressure bandage and any other appropriate methods.  I understands that a scar will form where the wound was created.  Surgical ointment will be applied to help protect the wound.  Sutures are not usually needed.    II.  RISKS AND POTENTIAL COMPLICATIONS   I understand the risks and potential complications of a skin biopsy include but are not limited to the following:  Bleeding  Infection  Pain  Scar/keloid  Skin discoloration  Incomplete Removal  Recurrence  Nerve Damage/Numbness/Loss of Function  Allergic Reaction to Anesthesia  Biopsies are diagnostic procedures and based on findings additional treatment or evaluation may be required  Loss or destruction of specimen resulting in no additional findings    My Dermatologist has explained to me the nature of the condition, the nature of the procedure, and the benefits to be reasonably expected compared with alternative approaches.  My Dermatologist has discussed the likelihood of major risks or complications of this procedure including the specific risks listed above, such as bleeding, infection, and scarring/keloid.  I understand that a scar is expected after this procedure.  I understand that my physician cannot predict if the scar will form a \"keloid,\" which extends beyond the borders of the wound that is created.  A keloid is a thick, painful, and bumpy scar.  A keloid can be difficult to treat, as it does not always respond well to therapy, which includes injecting cortisone directly into the keloid every few weeks.  While this usually reduces the pain and size of the scar, it does not eliminate it.      I understand that photographs may be taken before and after the procedure.  These will be maintained as part of the medical providers confidential records and may not be made " "available to me.  I further authorize the medical provider to use the photographs for teaching purposes or to illustrate scientific papers, books, or lectures if in his/her judgment, medical research, education, or science may benefit from its use.    I have had an opportunity to fully inquire about the risks and benefits of this procedure and its alternatives.   I have been given ample time and opportunity to ask questions and to seek a second opinion if I wished to do so.  I acknowledge that there have specifically been no guarantees as to the cosmetic results from the procedure.  I am aware that with any procedure there is always the possibility of an unexpected complication.    III. POST-PROCEDURAL CARE (WHAT YOU WILL NEED TO DO \"AFTER THE BIOPSY\" TO OPTIMIZE HEALING)    Keep the area clean and dry.  Try NOT to remove the bandage or get it wet for the first 24 hours.    Gently clean the area and apply surgical ointment (such as Vaseline petrolatum ointment, which is available \"over the counter\" and not a prescription) to the biopsy site for up to 2 weeks straight.  This acts to protect the wound from the outside world.      Sutures are not usually placed in this procedure.  If any sutures were placed, return for suture removal as instructed (generally 1 week for the face, 2 weeks for the body).      Take Acetaminophen (Tylenol) for discomfort, if no contraindications.  Ibuprofen or aspirin could make bleeding worse.    Call our office immediately for signs of infection: fever, chills, increased redness, warmth, tenderness, discomfort/pain, or pus or foul smell coming from the wound.    WHAT TO DO IF THERE IS ANY BLEEDING?  If a small amount of bleeding is noticed, place a clean cloth over the area and apply firm pressure for ten minutes.  Check the wound after 10 minutes of direct pressure.  If bleeding persists, try one more time for an additional 10 minutes of direct pressure on the area.  If the bleeding " becomes heavier or does not stop after the second attempt, or if you have any other questions about this procedure, then please call your [facility]'s Dermatologist by calling [phone] (SKIN).     I hereby acknowledge that I have reviewed and verified the site with my Dermatologist and have requested and authorized my Dermatologist to proceed with the procedure.       Scribe Attestation      I,:  Tatiana Mathias am acting as a scribe while in the presence of the attending physician.:       I,:  Patti Castanon MD personally performed the services described in this documentation    as scribed in my presence.:            This encounter (75552 or [id number]) has a MODERATE level of medical decision making (MDM) given the presence of at least 2 of the elements of MDM (below):  *MODERATE number and complexity of problems addressed: 1 or more chronic illnesses with exacerbation, progression, or side effects of treatment; OR 2 or more stable chronic illnesses; OR 1 undiagnosed new problem with uncertain prognosis; OR 1 acute illness with systemic symptoms; OR 1 acute uncomplicated injury  *MODERATE amount and/or complexity of data reviewed: this includes review of previous notes and/or lab tests, independent interpretation of tests performed by another healthcare professional, ordering tests, assessment requiring independent historian, or discussion with other healthcare professionals.  *MODERATE risk of complications and/or morbidity or mortality of patient management (for example, prescription drug management, decisions regarding minor surgery with identified patient or procedure risk factors).

## 2024-03-27 NOTE — PATIENT INSTRUCTIONS
"  INFORMED CONSENT DISCUSSION AND POST-OPERATIVE INSTRUCTIONS FOR PATIENT    I.  RATIONALE FOR PROCEDURE  I understand that a skin biopsy allows the Dermatologist to test a lesion or rash under the microscope to obtain a diagnosis.  It usually involves numbing the area with numbing medication and removing a small piece of skin; sometimes the area will be closed with sutures. In this specific procedure, sutures are not usually needed.  If any sutures are placed, then they are usually need to be removed in 2 weeks or less.    I understand that my Dermatologist recommends that a skin \"shave\" biopsy be performed today.  A local anesthetic, similar to the kind that a dentist uses when filling a cavity, will be injected with a very small needle into the skin area to be sampled.  The injected skin and tissue underneath \"will go to sleep” and become numb so no pain should be felt afterwards.  An instrument shaped like a tiny \"razor blade\" (shave biopsy instrument) will be used to cut a small piece of tissue and skin from the area so that a sample of tissue can be taken and examined more closely under the microscope.  A slight amount of bleeding will occur, but it will be stopped with direct pressure and a pressure bandage and any other appropriate methods.  I understands that a scar will form where the wound was created.  Surgical ointment will be applied to help protect the wound.  Sutures are not usually needed.    II.  RISKS AND POTENTIAL COMPLICATIONS   I understand the risks and potential complications of a skin biopsy include but are not limited to the following:  Bleeding  Infection  Pain  Scar/keloid  Skin discoloration  Incomplete Removal  Recurrence  Nerve Damage/Numbness/Loss of Function  Allergic Reaction to Anesthesia  Biopsies are diagnostic procedures and based on findings additional treatment or evaluation may be required  Loss or destruction of specimen resulting in no additional findings    My " "Dermatologist has explained to me the nature of the condition, the nature of the procedure, and the benefits to be reasonably expected compared with alternative approaches.  My Dermatologist has discussed the likelihood of major risks or complications of this procedure including the specific risks listed above, such as bleeding, infection, and scarring/keloid.  I understand that a scar is expected after this procedure.  I understand that my physician cannot predict if the scar will form a \"keloid,\" which extends beyond the borders of the wound that is created.  A keloid is a thick, painful, and bumpy scar.  A keloid can be difficult to treat, as it does not always respond well to therapy, which includes injecting cortisone directly into the keloid every few weeks.  While this usually reduces the pain and size of the scar, it does not eliminate it.      I understand that photographs may be taken before and after the procedure.  These will be maintained as part of the medical providers confidential records and may not be made available to me.  I further authorize the medical provider to use the photographs for teaching purposes or to illustrate scientific papers, books, or lectures if in his/her judgment, medical research, education, or science may benefit from its use.    I have had an opportunity to fully inquire about the risks and benefits of this procedure and its alternatives.   I have been given ample time and opportunity to ask questions and to seek a second opinion if I wished to do so.  I acknowledge that there have specifically been no guarantees as to the cosmetic results from the procedure.  I am aware that with any procedure there is always the possibility of an unexpected complication.    III. POST-PROCEDURAL CARE (WHAT YOU WILL NEED TO DO \"AFTER THE BIOPSY\" TO OPTIMIZE HEALING)    Keep the area clean and dry.  Try NOT to remove the bandage or get it wet for the first 24 hours.    Gently clean the area " "and apply surgical ointment (such as Vaseline petrolatum ointment, which is available \"over the counter\" and not a prescription) to the biopsy site for up to 2 weeks straight.  This acts to protect the wound from the outside world.      Sutures are not usually placed in this procedure.  If any sutures were placed, return for suture removal as instructed (generally 1 week for the face, 2 weeks for the body).      Take Acetaminophen (Tylenol) for discomfort, if no contraindications.  Ibuprofen or aspirin could make bleeding worse.    Call our office immediately for signs of infection: fever, chills, increased redness, warmth, tenderness, discomfort/pain, or pus or foul smell coming from the wound.    WHAT TO DO IF THERE IS ANY BLEEDING?  If a small amount of bleeding is noticed, place a clean cloth over the area and apply firm pressure for ten minutes.  Check the wound after 10 minutes of direct pressure.  If bleeding persists, try one more time for an additional 10 minutes of direct pressure on the area.  If the bleeding becomes heavier or does not stop after the second attempt, or if you have any other questions about this procedure, then please call your [facility]'s Dermatologist by calling [phone] (SKIN).     I hereby acknowledge that I have reviewed and verified the site with my Dermatologist and have requested and authorized my Dermatologist to proceed with the procedure.           "

## 2024-04-01 PROCEDURE — 88341 IMHCHEM/IMCYTCHM EA ADD ANTB: CPT | Performed by: STUDENT IN AN ORGANIZED HEALTH CARE EDUCATION/TRAINING PROGRAM

## 2024-04-01 PROCEDURE — 88342 IMHCHEM/IMCYTCHM 1ST ANTB: CPT | Performed by: STUDENT IN AN ORGANIZED HEALTH CARE EDUCATION/TRAINING PROGRAM

## 2024-04-01 PROCEDURE — 88305 TISSUE EXAM BY PATHOLOGIST: CPT | Performed by: STUDENT IN AN ORGANIZED HEALTH CARE EDUCATION/TRAINING PROGRAM

## 2024-04-04 NOTE — RESULT ENCOUNTER NOTE
Called and informed pt of biopsy results. Pt instructed that he should hear call from Mohs scheduling within 2 weeks, if does not hear from them; then call office.     Mohs referral placed.

## 2024-04-05 ENCOUNTER — TELEPHONE (OUTPATIENT)
Dept: DERMATOLOGY | Facility: CLINIC | Age: 84
End: 2024-04-05

## 2024-04-05 NOTE — TELEPHONE ENCOUNTER
Pre- operative Mohs Telephone Scheduling Note    Do you have a pacemaker, defibrillator, spinal or brain stimulator? no    Do you take antibiotics before skin or dental procedures? no  If yes, will likely require pre-operative antibiotics. Ask  the patient why they take the antibiotics (usually because of joint replacement).    Do you have a history of a joint replacements within the past 2 years? no   If yes, will likely require pre-operative antibiotics. Ask if orthopaedic surgeon has prescribed pre-operative antibiotics to take before procedures/dental work?    Do you take any OTC medications that thin your blood (Aspirin, Aleve, Ibuprofen) or supplements that thin your blood (fish oil, garlic, vitamin E, Ginko Biloba)? yes: garlic    Do you take any prescribed medications that thin your blood (Coumadin, Plavix, Xarelto, Eliquis or another prescribed blood thinner)? no    Do you have an allergy to lidocaine or epinephrine? no    Do you have allergies to Iodine? no    Do you wear a lidocaine patch? no    Have you ever been diagnosed with HIV, AIDS, Hep B and Hep C? no    Do you use a cane, walker or wheelchair? no    Is the patient from a nursing home? no If yes, Is there any special accommodations that is needed for patient n/a    Do you smoke? no      If yes,  patient to try and stop 2 days before surgery and 7 days after the surgery. Minimizing smoking as much as possible during this time will improve healing and the cosmetic result after surgery.    Do you use supplemental oxygen? If so, how many liters and can you be off it for a short period of time? N/a    Date scheduled: 6/19 11:00 AM Dr Anaya BCC left upper back    Coordination of Care with other provider (Oculoplastics, Plastics, ENT) required? no   IF YES, PLEASE FORWARD TO APPROPRIATE PERSONNEL TO HELP COORDINATE.    Are there remaining tumors to be scheduled? no    Was Prior Authorization obtained? No (please use [email or web address] to document  prior auth)

## 2024-04-13 ENCOUNTER — APPOINTMENT (OUTPATIENT)
Dept: LAB | Facility: CLINIC | Age: 84
End: 2024-04-13
Payer: COMMERCIAL

## 2024-04-13 DIAGNOSIS — R39.9 UTI SYMPTOMS: ICD-10-CM

## 2024-04-13 LAB
BACTERIA UR QL AUTO: NORMAL /HPF
BILIRUB UR QL STRIP: NEGATIVE
CLARITY UR: CLEAR
COLOR UR: NORMAL
GLUCOSE UR STRIP-MCNC: NEGATIVE MG/DL
HGB UR QL STRIP.AUTO: NEGATIVE
KETONES UR STRIP-MCNC: NEGATIVE MG/DL
LEUKOCYTE ESTERASE UR QL STRIP: NEGATIVE
NITRITE UR QL STRIP: NEGATIVE
NON-SQ EPI CELLS URNS QL MICRO: NORMAL /HPF
PH UR STRIP.AUTO: 6.5 [PH]
PROT UR STRIP-MCNC: NEGATIVE MG/DL
RBC #/AREA URNS AUTO: NORMAL /HPF
SP GR UR STRIP.AUTO: 1.02 (ref 1–1.03)
UROBILINOGEN UR STRIP-ACNC: <2 MG/DL
WBC #/AREA URNS AUTO: NORMAL /HPF

## 2024-04-13 PROCEDURE — 81001 URINALYSIS AUTO W/SCOPE: CPT

## 2024-04-13 PROCEDURE — 87086 URINE CULTURE/COLONY COUNT: CPT

## 2024-04-14 LAB — BACTERIA UR CULT: ABNORMAL

## 2024-04-15 ENCOUNTER — TELEPHONE (OUTPATIENT)
Age: 84
End: 2024-04-15

## 2024-04-15 DIAGNOSIS — N39.0 URINARY TRACT INFECTION WITHOUT HEMATURIA, SITE UNSPECIFIED: Primary | ICD-10-CM

## 2024-04-15 RX ORDER — AMOXICILLIN AND CLAVULANATE POTASSIUM 875; 125 MG/1; MG/1
1 TABLET, FILM COATED ORAL EVERY 12 HOURS SCHEDULED
Qty: 14 TABLET | Refills: 0 | Status: SHIPPED | OUTPATIENT
Start: 2024-04-15 | End: 2024-04-22

## 2024-04-15 NOTE — TELEPHONE ENCOUNTER
Patient called as he received a letter in the mail addressed to him from the MOHS department but the letter inside had another patient's name one it. I advised I would [facility] asking someone to return his call in regards to this matter.

## 2024-04-27 LAB — PSA SERPL-MCNC: 7.81 NG/ML

## 2024-05-02 ENCOUNTER — NURSE TRIAGE (OUTPATIENT)
Age: 84
End: 2024-05-02

## 2024-05-02 DIAGNOSIS — R97.20 ELEVATED PSA: Primary | ICD-10-CM

## 2024-05-02 DIAGNOSIS — Z12.5 SCREENING FOR PROSTATE CANCER: Primary | ICD-10-CM

## 2024-05-10 NOTE — TELEPHONE ENCOUNTER
Wrong dx code was placed on the PSA order. Patient went today.    The dx should be elevated PSA    Patient was informed by the lab that he will receive a bill for $160 if not corrected

## 2024-05-11 LAB — PSA SERPL-MCNC: 7.74 NG/ML

## 2024-05-21 ENCOUNTER — NURSE TRIAGE (OUTPATIENT)
Age: 84
End: 2024-05-21

## 2024-05-21 DIAGNOSIS — R39.9 UTI SYMPTOMS: Primary | ICD-10-CM

## 2024-05-21 NOTE — TELEPHONE ENCOUNTER
Additional Information   Negative: The patient has severe uncontrolled pain   Negative: The patient has a fever of 101 or higher   Negative: The patient has persistent vomiting    Answer Questions - Initial Assessment   When did this start: within last few days, worst last night up every hour and a half.     Are you voiding normal amounts or small amounts: Only at night time, does not feel they are emptying bladder and urinates small amounts.       Do you have any other urinary symptoms such as incontinence, nocturia, weak stream or dysuria: no    Do you have a fever of 101 or higher: no    Have you taken any cold or allergy medication: no    Are you taking a diuretic: no    Are you diabetic:no     30-40oz. Of water throughout the day, drinks 1 cup of tea. Stops drinking fluids at midnight and goes to bed at midnight.  Drinks cranberry juice and takes cranberry tablets, he cut back on vitamin C, takes about 500mg.    Has arthritis and has been alternating tylenol and ibuprofen, has been taking this more frequently.   Reviewed bladder irritants to avoid, and to stay hydrated with at least 64 oz. Of water/day as long as no fluid restrictions, avoidance of constipation. ED precautions reviewed as well.  Has ongoing lower back flank irritation, reports it is not pain, but a weird feeling that he had in the past with his UTIs. Urine testing ordered he lyn go to [facility] I faxed orders to [phone]    Protocols used: Urinary Frequency        [id number] [place], [place] - [address] [[id number]]

## 2024-05-21 NOTE — TELEPHONE ENCOUNTER
Reason for Disposition  • The patient has new BPH symptoms: frequency, urgency, nocturia, incontinence with concern for retention issues    Protocols used: Urinary Frequency

## 2024-05-22 LAB
APPEARANCE UR: CLEAR
BACTERIA UR QL AUTO: NORMAL /HPF
BILIRUB UR QL STRIP: NEGATIVE
COLOR UR: YELLOW
GLUCOSE UR QL STRIP: NEGATIVE
HGB UR QL STRIP: NEGATIVE
HYALINE CASTS #/AREA URNS LPF: NORMAL /LPF
KETONES UR QL STRIP: NEGATIVE
LEUKOCYTE ESTERASE UR QL STRIP: NEGATIVE
NITRITE UR QL STRIP: NEGATIVE
PH UR STRIP: NORMAL [PH] (ref 5–8)
PROT UR QL STRIP: NEGATIVE
RBC #/AREA URNS HPF: NORMAL /HPF
SP GR UR STRIP: 1.02 (ref 1–1.03)
SQUAMOUS #/AREA URNS HPF: NORMAL /HPF
WBC #/AREA URNS HPF: NORMAL /HPF

## 2024-05-23 NOTE — TELEPHONE ENCOUNTER
Spoke with Joel Millard and informed. Advised to keep upcoming appt and call with any questions or concerns. Pt verbalized understanding.

## 2024-06-05 ENCOUNTER — NURSE TRIAGE (OUTPATIENT)
Age: 84
End: 2024-06-05

## 2024-06-05 NOTE — TELEPHONE ENCOUNTER
"Reason for Disposition  • Nursing judgment    Answer Assessment - Initial Assessment Questions  1. REASON FOR CALL or QUESTION: \"What is your reason for calling today?\" or \"How can I best help you?\" or \"What question do you have that I can help answer?\"      MOHS pre-procedure questions    Protocols used: Information Only Call - No Triage-ADULT-[place]    "

## 2024-06-05 NOTE — TELEPHONE ENCOUNTER
Spoke to patient. Reviewed abx that is prescribed by the dentist, not needed for this procedure. Also reviewed vitamins and which can be paused if not recommended by a physician.   Highly recommended patient bring a paper list of ALL medications AND vitamins that are taken regularly or PRN.

## 2024-06-05 NOTE — TELEPHONE ENCOUNTER
"Pt called in. Pt scheduled for MOHS on 6/19 with Dr. Anaya.     Pt reviewed MOHS pre-procedure letter and wanted to make Dr. Anaya aware of current vitamins/supplements:  - Vitamin C 500mg every other day/every 3rd day  - B50 complex 1 tablet every other day  - lecithin 1200mg BID  - glucosamine chondroitin 1 tablet BID  - zinc 25mg every other day/every 3rd day  - beta karatine Vitamin A 1000 IU every other day  - Vitamin E 200 IU every other day  - echinacea 400 mg daily  - omega-3 fish oil 1400 mg BID  - Co Q-10 100mg BID  - ginkgo biloba 60mg BID  -phytosterol complex 1000mg BID  - probiotic acidophilis 1 tablet BID  - acidophilis digestive enzyme 1 tablet daily with food  - deodorized garlic 1000mg BID  - Vitamin B12 500mg BID  - papaya digestive enzyme 6 tablets after dinner  Pt verbalized understanding to stop specific vitamins/supplements on this list per MOHS pre-procedure instructions.    Pt also takes these vitamins/supplements that contain \"small doses\" of Vitamin E & fish oil; Pt does not want to stop these as [facility]:  - occuvite 50 mg daily  - eye support supplement 1 tablet daily  - multivitamin 1 tablet daily    Pt also prescribed abx prior to dentist appts. Pt reports that dentist typically prescribes amoxicillin 500mg. Pt requesting either amoxicillin or augmentin; reports previous occurrence of being prescribed a different abx caused Pt to go into a fib (does not remember name of abx). Please send script to [facility] in [facility].        "

## 2024-06-12 ENCOUNTER — OFFICE VISIT (OUTPATIENT)
Dept: UROLOGY | Facility: CLINIC | Age: 84
End: 2024-06-12
Payer: COMMERCIAL

## 2024-06-12 VITALS
BODY MASS INDEX: 29.92 KG/M2 | SYSTOLIC BLOOD PRESSURE: 160 MMHG | DIASTOLIC BLOOD PRESSURE: 84 MMHG | HEIGHT: 69 IN | HEART RATE: 72 BPM | OXYGEN SATURATION: 97 % | WEIGHT: 202 LBS

## 2024-06-12 DIAGNOSIS — R97.20 ELEVATED PSA: Primary | ICD-10-CM

## 2024-06-12 PROCEDURE — 99213 OFFICE O/P EST LOW 20 MIN: CPT | Performed by: PHYSICIAN ASSISTANT

## 2024-06-12 NOTE — PROGRESS NOTES
"  UROLOGY PROGRESS NOTE   Patient Identifiers: Joel Millard (MRN [id number])  Date of Service: 6/12/2024    Subjective:   83-year-old man history of elevated PSA.  Seen about 6 months ago.  He had a recent PSA that was 7.74 within his range.  His PSA in October was 6.69.  He tells me he will never have a prostate biopsy.  Gets up 1 time per night.  Has occasional slow flow.  He is not any interested in any medication for his prostate.    Reason for visit: Elevated PSA follow-up    Objective:     VITALS:    Vitals:    06/12/24 1303   BP: 160/84   Pulse: 72   SpO2: 97%           LABS:  Lab Results   Component Value Date    HGB 13.4 09/01/2023    HCT 40.5 09/01/2023    WBC 8.31 09/01/2023     09/01/2023   ]    Lab Results   Component Value Date    K 4.3 10/20/2023     10/20/2023    CO2 31 10/20/2023    BUN 20 10/20/2023    CREATININE 1.09 10/20/2023    CALCIUM 9.1 10/20/2023   ]        INPATIENT MEDS:    Current Outpatient Medications:     latanoprost (XALATAN) 0.005 % ophthalmic solution, , Disp: , Rfl:     latanoprost (XALATAN) 0.005 % ophthalmic solution, Administer 1 drop to both eyes daily at bedtime, Disp: 5 mL, Rfl: 0    polyethylene glycol (MIRALAX) 17 g packet, Take 17 g by mouth daily Do not start before September 3, 2023., Disp: 14 each, Rfl: 0    senna (SENOKOT) 8.6 mg, Take 2 tablets (17.2 mg total) by mouth daily Do not start before September 3, 2023., Disp: 30 tablet, Rfl: 0      Physical Exam:   /84 (BP Location: Left arm, Patient Position: Sitting, Cuff Size: Standard)   Pulse 72   Ht 5' 9\" (1.753 m)   Wt 91.6 kg (202 lb)   SpO2 97%   BMI 29.83 kg/m²   GEN: no acute distress    RESP: breathing comfortably with no accessory muscle use    ABD: soft, non-tender, non-distended   INCISION:    EXT: no significant peripheral edema       RADIOLOGY:   none     Assessment:   #1.  Elevated PSA    Plan:   -Follow-up in 6 months  -Recheck digital exam at that time  -I recommend he no " longer get PSAs if he is never going to have a prostate biopsy  -

## 2024-06-13 NOTE — TELEPHONE ENCOUNTER
Spoke to patient. Advised him he may resume the medications and bring a full list of what he is taking so we can add it to his list in the system. Understanding verbalized.

## 2024-06-19 ENCOUNTER — PROCEDURE VISIT (OUTPATIENT)
Dept: DERMATOLOGY | Facility: CLINIC | Age: 84
End: 2024-06-19
Payer: COMMERCIAL

## 2024-06-19 VITALS
SYSTOLIC BLOOD PRESSURE: 158 MMHG | OXYGEN SATURATION: 96 % | WEIGHT: 202 LBS | TEMPERATURE: 98.1 F | BODY MASS INDEX: 29.92 KG/M2 | DIASTOLIC BLOOD PRESSURE: 75 MMHG | HEART RATE: 77 BPM | HEIGHT: 69 IN

## 2024-06-19 DIAGNOSIS — D48.5 NEOPLASM OF UNCERTAIN BEHAVIOR OF SKIN: ICD-10-CM

## 2024-06-19 PROCEDURE — 17313 MOHS 1 STAGE T/A/L: CPT | Performed by: DERMATOLOGY

## 2024-06-19 PROCEDURE — 12032 INTMD RPR S/A/T/EXT 2.6-7.5: CPT | Performed by: DERMATOLOGY

## 2024-06-19 PROCEDURE — 17314 MOHS ADDL STAGE T/A/L: CPT | Performed by: DERMATOLOGY

## 2024-06-19 RX ORDER — OMEGA-3S/DHA/EPA/FISH OIL/D3 300MG-1000
CAPSULE ORAL
COMMUNITY

## 2024-06-19 RX ORDER — MAG HYDROX/ALUMINUM HYD/SIMETH 400-400-40
SUSPENSION, ORAL (FINAL DOSE FORM) ORAL
COMMUNITY

## 2024-06-19 RX ORDER — GARLIC 180 MG
TABLET, DELAYED RELEASE (ENTERIC COATED) ORAL
COMMUNITY

## 2024-06-19 RX ORDER — UREA 10 %
LOTION (ML) TOPICAL DAILY
COMMUNITY

## 2024-06-19 RX ORDER — ASCORBIC ACID 500 MG
500 TABLET ORAL DAILY
COMMUNITY

## 2024-06-19 RX ORDER — VITAMIN B COMPLEX
1 CAPSULE ORAL DAILY
COMMUNITY

## 2024-06-19 RX ORDER — AMPICILLIN TRIHYDRATE 250 MG
100 CAPSULE ORAL
COMMUNITY

## 2024-06-19 RX ORDER — GREEN TEA/HOODIA GORDONII 315-12.5MG
CAPSULE ORAL
COMMUNITY

## 2024-06-19 RX ORDER — ASCORBIC ACID 1000 MG
TABLET ORAL
COMMUNITY

## 2024-06-19 NOTE — PROGRESS NOTES
MOHS Procedure Note    Patient: Joel Millard  : 1940  MRN: [id number]  Date: 2024    History of Present Illness: The patient is a 83 y.o. male who presents with complaints of Basal cell carcinoma of the left upper back.     Past Medical History:   Diagnosis Date    Basal cell carcinoma 2024    left upper back, mohs       Past Surgical History:   Procedure Laterality Date    FL RETROGRADE PYELOGRAM  2023    MOHS SURGERY Left 2024    BCC left upper back, Dr Anaya    DC CYSTO/URETERO W/LITHOTRIPSY &INDWELL STENT INSRT Left 2023    Procedure: CYSTOSCOPY URETEROSCOPY WITH LITHOTRIPSY HOLMIUM LASER, RETROGRADE PYELOGRAM , BAKET STONE EXTRACTION AND INSERTION STENT URETERAL;  Surgeon: Raul Thacker MD;  Location: [facility];  Service: Urology    TONSILLECTOMY      child          Current Outpatient Medications:     Aloe Vera 99 % GEL, Apply topically, Disp: , Rfl:     ascorbic acid (VITAMIN C) 500 MG tablet, Take 500 mg by mouth daily, Disp: , Rfl:     b complex vitamins capsule, Take 1 capsule by mouth daily, Disp: , Rfl:     beta carotene 87255 UNIT capsule, Take 10,000 Units by mouth daily, Disp: , Rfl:     Co Q-10 50 MG, Take 100 mg by mouth, Disp: , Rfl:     CRANBERRY FRUIT PO, Take by mouth, Disp: , Rfl:     DEODORIZED GARLIC PO, Take by mouth, Disp: , Rfl:     Flaquita, Zingiber officinalis, (Flaquita Root) 500 MG CAPS, Take by mouth, Disp: , Rfl:     Ginkgo Biloba 60 MG CAPS, Take by mouth, Disp: , Rfl:     Ginsengs-[place] Jelly (Ginseng Complex/Royal Jelly) 800-200 MG CAPS, Take by mouth, Disp: , Rfl:     glucosamine-chondroitin 500-400 MG tablet, Take 1 tablet by mouth 3 (three) times a day, Disp: , Rfl:     [place] 565 MG CAPS, Take by mouth, Disp: , Rfl:     latanoprost (XALATAN) 0.005 % ophthalmic solution, , Disp: , Rfl:     latanoprost (XALATAN) 0.005 % ophthalmic solution, Administer 1 drop to both eyes daily at bedtime, Disp: 5 mL, Rfl: 0    Multiple  Vitamins-Minerals (OCUVITE ADULT 50+ PO), Take by mouth, Disp: , Rfl:     Multiple Vitamins-Minerals (ONE DAILY MENS PO), Take by mouth, Disp: , Rfl:     Nutritional Supplements (ULTRAMINO SOY PROTEIN PO), Take by mouth, Disp: , Rfl:     Omega 3-6-9 Fatty Acids (TRIPLE OMEGA-3-6-9 PO), Take by mouth, Disp: , Rfl:     Omega-3 Fatty Acids (Ultra Omega-3 Fish Oil) 1400 MG CAPS, Take by mouth, Disp: , Rfl:     PAPAYA ENZYME PO, Take by mouth, Disp: , Rfl:     Phosphatidylserine (Neuro-PS) 50 MG CAPS, Take 100 mg by mouth, Disp: , Rfl:     Phytosterol Esters (PHYTOSTEROL COMPLEX PO), Take by mouth, Disp: , Rfl:     polyethylene glycol (MIRALAX) 17 g packet, Take 17 g by mouth daily Do not start before September 3, 2023., Disp: 14 each, Rfl: 0    Saw Palmetto 450 MG CAPS, Take by mouth, Disp: , Rfl:     selenium 50 MCG TABS, Take 50 mcg by mouth daily, Disp: , Rfl:     senna (SENOKOT) 8.6 mg, Take 2 tablets (17.2 mg total) by mouth daily Do not start before September 3, 2023., Disp: 30 tablet, Rfl: 0    Turmeric Curcumin 500 MG CAPS, Take by mouth, Disp: , Rfl:     vitamin B-12 (VITAMIN B-12) 500 mcg tablet, Take by mouth daily, Disp: , Rfl:     vitamin E, tocopherol, 200 units capsule, Take 200 Units by mouth daily, Disp: , Rfl:     Allergies   Allergen Reactions    Sulfamethoxazole-Trimethoprim Other (See Comments)       Physical Exam:   Vitals:    06/19/24 1114   BP: 158/75   Pulse: 77   Temp: 98.1 °F (36.7 °C)   SpO2: 96%     General: Awake, Alert, Oriented x 3, Mood and affect appropriate  Respiratory: Respirations even and unlabored  Cardiovascular: Peripheral pulses intact; no edema  Musculoskeletal Exam: n/a    Skin: 2 cm x 3.4 cm pink plaque with eroded borders    Assessment: Biopsy confirmed basal cell carcinoma superficial and nodular type of the left upper back.     Plan: Mohs    Time of H&P Completion:1115    MOHS Procedure Timeout      Flowsheet Row Most Recent Value   Timeout: 1122   Patient Identity  Verified: Yes   Correct Site Verified: Yes   Correct Procedure Verified: Yes            MOHS Diagnosis/Indication/Location/ID      Flowsheet Row Most Recent Value   Pathology Type Basal cell carcinoma   Anatomic Site left upper back   Indications for MOHS tumor location   MOHS ID [id number]            MOHS Site/Accession/Pre-Post      Flowsheet Row Most Recent Value   Original Site Identified (as submitted by referring clinician) Photo, Referral   Biopsy Accession/Specimen # (as submitted by referring clincian) [id number]   Pre-MOHS Size Length (cm) 2   Pre-MOHS Size Width (cm) 3.4   Post-MOHS Size-Length (cm) 3   Post MOHS Size-Width (cm) 4.5   Repair Type Intermediate layered closure   Suture Type Vicryl, Prolene   Prolene Suture Size 4   Vicryl Suture Size 3   Final repair length (cm): 7   Anesthetic Used 1% Lidocaine with epinephrine  [10]            MOHS Tumor Stage 1 Information      Flowsheet Row Most Recent Value   Tissue Sections (blocks) 4   Microscopic Exam Section 1: No tumor identified in section.  [*]   Microscopic Exam Section 2: Irregularly shaped cords and strands of basaloid keratinocytes infiltrate the dermis with a spiky growth pattern. The nuclei at the periphery of the islands have a palisaded arrangement. Islands are associated with a fibromyxoid stroma and clefting.  [*]   Microscopic Exam Section 3: No tumor identified in section.  [*]   Microscopic Exam Section 4: No tumor identified in section.  [*]   Tumor Clear After Stage I? No            MOHS Tumor Stage 2 Information      Flowsheet Row Most Recent Value   Tissue Sections (blocks) 1   Microscopic Exam Section 1: No tumor identified in section.   Tumor Clear After Stage II? Yes                    Patient identified, procedure verified, site identified and verified. Time out completed. Surgical removal of the lesion discussed with the patient (risks and benefits, including possibility of scarring, infection, recurrence or potential for  further treatment)  I have specifically identified the site with the patient. I have discussed the fact that the patient will have a scar after the procedure regardless of granulation or repair with sutures. I have discussed that the repair options can range from granulation in some cases to linear or curvilinear closures to larger flaps or grafts.  There are sometimes flaps or grafts used that require multiples stages of surgery and will not be completed today, rather be completed over a series of appointments. I have discussed that occasionally due to location, size or depth of the lesion I may recommend consultation with and transfer of care for further removal or the reconstruction to another provider such as ophthalmology surgery, plastic surgery, ENT surgery, or surgical oncology. There are cases in which other testing such as imaging with MRI or CT scan or testing of lymph nodes is recommended because of the nature/depth/location of tumor seen during the removal. There is a risk of injury to nerves causing temporary or permanent numbness or the inability to move muscles full such as the inability to lift eyebrows. Questions answered and verbal and written consent was obtained.    The tumor qualifies for Mohs based on AUC criteria. Dr. Anaya served as the surgeon and pathologist during the procedure.    With the patient in the supine position and under adequate local anesthesia with 1% lidocaine with epinephrine 1:100,000, the defect was scrubbed with Chlorhexidine. Sterile drapes were placed from the sterile tray.  Because of the location of the surgical defect, an intermediate closure was judged to give the best possible cosmetic and functional result.  The edges of the defect were carefully debrided removing any dead or coagulated tissue.      Hemostasis was obtained by pinpoint electrocoagulation.  Careful planning of removal of redundant tissue at either end of the defect was drawn out so that the  suture lines would fall in the optimal orientation with regard to the relaxed skin tension lines.  These were then removed with a #15 blade scalpel.  The wound was then approximated by a deep layer of buried vertical mattress sutures and the cutaneous margins were approximated and closed by superficial sutures as noted above.  Estimated blood loss was less than 5 mL.      The patient tolerated the procedure well.  The wound was dressed with petrolatum, a non-stick pad, and a compression dressing.     Ashish Anaya MD served as the surgeon and pathologist during the procedure.    Postoperative care: Wound care discussed at length.  I urged the patient to call us if any problems or question should arise.     Complications: none  Post-op medications: none  Patient condition after procedure: stable  Discharge plans: Plan for return to us for suture removal, as scheduled in 14 days.     >2cm BCC cleared with 2 stages of mohs and repaired with 7cm closure.  Well tolerated.  S/R in 2 weeks.    Scribe Attestation      I,:  Sue De Santiago am acting as a scribe while in the presence of the attending physician.:       I,:  Ashish Anaya MD personally performed the services described in this documentation    as scribed in my presence.:

## 2024-06-19 NOTE — PATIENT INSTRUCTIONS
"Mohs Microscopic Surgery After Care    WOUND CARE AFTER SURGERY:    Do NOT to remove the pressure bandage for 48 hours. Keep the area clean and dry while this bandage is on.    After removing the bandage for the first time, gently clean the area with soap and water. If the bandage is difficult to remove, getting the bandage wet in the shower will sometimes help soften the adhesive and allow it to be removed more easily.     You will now need to cleanse this area daily in the shower with gentle soap. There is no need to scrub the area. Apply plain Vaseline ointment (this is over the counter and not a prescription) to the site followed by a clean appropriately sized bandage to area.  Non stick dressing and paper tape (or Hypafix) are recommended for sensitive skin but a bandaid is fine if it covers the area well.    You will need to continue the above daily wound care until you return for suture removal in 14 days (generally 7 days for the face, 10-14 days off the face)      RESTRICTIONS:     For two DAYS:   - You will need to take it very easy as this time is highest risk for bleeding. Being a \"couch potato\" during these two days is generally recommended.   - For surgeries on the face/neck/scalp: Avoid leaning down to pick things up off the floor as this brings blood up to your head. Instead, squat down to pick things up.     For two WEEKS:   - No heavy lifting (anything greater than 10 pounds)   - You can start to do slow, gentle activities such as slow walking but nothing to increase your heart rate and blood pressure too much (such as cardiovascular exercise). It is important to take it easy as there is still a risk for bleeding and a high risk popping of stitches open during this time.     MANAGING YOUR PAIN AFTER SURGERY     You can expect to have some pain after surgery. This is normal. The pain is typically worse the first two days after surgery, and quickly begins to get better.     The best strategy for " controlling your pain after surgery is around the clock pain control. You can take over the counter Acetaminophen (Tylenol) for discomfort, if no contraindications.     If you are taking this at the maximum dose, you can alternate this with Motrin (ibuprofen or Advil) as well. Alternating these medications with each other allows you to maximize your pain control. In addition to Tylenol and Motrin, you can use heating pads or ice packs on your incisions to help reduce your pain.     How will I alternate your regular strength over-the-counter pain medication?  You will take a dose of pain medication every three hours.   Start by taking 650 mg of Tylenol (2 pills of 325 mg)   3 hours later take 600 mg of Motrin (3 pills of 200 mg)   3 hours after taking the Motrin take 650 mg of Tylenol   3 hours after that take 600 mg of Motrin.    See example - if your first dose of Tylenol is at 12:00 PM     12:00 PM  Tylenol 650 mg (2 pills of 325 mg)    3:00 PM  Motrin 600 mg (3 pills of 200 mg)    6:00 PM  Tylenol 650 mg (2 pills of 325 mg)    9:00 PM  Motrin 600 mg (3 pills of 200 mg)    Continue alternating every 3 hours      Important:   Do not take more than 4000mg of Tylenol or 3200mg of Motrin in a 24-hour period.     What if I still have pain?   If you have pain that is not controlled with the over-the-counter pain medications (Tylenol and Motrin or Advil), don't hesitate to call our staff using the number provided. We will help make sure you are managing your pain in the best way possible, and if necessary, we can provide a prescription for additional pain medication.     CALL OUR OFFICE IMMEDIATELY FOR ANY SIGNS OF INFECTION:    This includes fever, chills, increased redness, warmth, tenderness, severe discomfort/pain, or pus or foul smell coming from the wound. If you are experiencing any of the above, please call [facility] directly at [phone].    IF BLEEDING IS NOTICED:    Place a clean cloth  over the area and apply firm pressure for thirty minutes.  Check the wound ONLY after 30 minutes of direct pressure; do not cheat and sneak a peak, as that does not count.  If bleeding persists after 30 minutes of legitimate direct pressure, then try one more round of direct pressure to the area.  Should the bleeding become heavier or not stop after the second attempt, call [facility] directly at [phone]. Your call will get routed to the dermatology surgeon on call even after hours.

## 2024-07-01 ENCOUNTER — OFFICE VISIT (OUTPATIENT)
Dept: DERMATOLOGY | Facility: CLINIC | Age: 84
End: 2024-07-01

## 2024-07-01 DIAGNOSIS — Z48.02 ENCOUNTER FOR REMOVAL OF SUTURES: Primary | ICD-10-CM

## 2024-07-01 PROCEDURE — NC001 PR NO CHARGE: Performed by: DERMATOLOGY

## 2024-07-01 NOTE — PROGRESS NOTES
Suture removal    Date/Time: 7/1/2024 1:15 PM    Performed by: Darcy Lundy RN  Authorized by: Ashish Anaya MD  Universal Protocol:  Consent: Verbal consent obtained. Written consent not obtained.  Risks and benefits: risks, benefits and alternatives were discussed  Consent given by: patient  Timeout called at: 7/1/2024 1:32 PM.  Patient understanding: patient states understanding of the procedure being performed  Patient consent: the patient's understanding of the procedure matches consent given  Procedure consent: procedure consent matches procedure scheduled  Relevant documents: relevant documents present and verified  Test results: test results not available  Site marked: the operative site was not marked  Radiology Images displayed and confirmed. If images not available, report reviewed: imaging studies not available  Patient identity confirmed: verbally with patient      Patient location:  Clinic  Location:     Laterality:  Left    Location:  Trunk    Trunk location:  Back (upper back)  Procedure details:     Tools used:  Suture removal kit    Wound appearance:  No sign(s) of infection, good wound healing, clean, moist, nonpurulent, nontender and pink    Number of sutures removed:  10  Post-procedure details:     Post-removal:  Steri-Strips applied    Patient tolerance of procedure:  Tolerated well, no immediate complications  Comments:      Patient was encouraged to continue to clean and care for the wound until fully healed. Patient was encouraged to continue to follow up for regular full body skin exams as scheduled.             Well healing scar, sutures removed.    Scribe Attestation      I,:  Darcy Lundy RN am acting as a scribe while in the presence of the attending physician.:       I,:  Ashish Anaya MD personally performed the services described in this documentation    as scribed in my presence.:

## 2024-07-25 ENCOUNTER — NURSE TRIAGE (OUTPATIENT)
Age: 84
End: 2024-07-25

## 2024-07-25 NOTE — TELEPHONE ENCOUNTER
Pt calling in post mohs to left upper back 6/19/24. States all of the sudden can't lay on back, feels like theres irritation or burning sensation and reports it feels reddish where scar is. Denies fever or chills. States there is a little itching. Unable to determine if area is warm to touch, denies any pus or smell. Notes pain when rubbed against chair or pressing against site. Per wife, area is healed but there is redness. Received current sx seem reassuring and tenderness/redness can be part of healing process.     Pt requesting to come in as he prefers to have office ensure everything is healing well. Scheduled NV for tomorrow. No other questions at this time.

## 2024-07-26 ENCOUNTER — CLINICAL SUPPORT (OUTPATIENT)
Dept: DERMATOLOGY | Facility: CLINIC | Age: 84
End: 2024-07-26
Payer: COMMERCIAL

## 2024-07-26 DIAGNOSIS — Z48.89 ENCOUNTER FOR POST SURGICAL WOUND CHECK: Primary | ICD-10-CM

## 2024-07-26 PROCEDURE — 99212 OFFICE O/P EST SF 10 MIN: CPT

## 2024-07-26 NOTE — PROGRESS NOTES
WOUND CHECK    Physical Exam:  Anatomic Location Affected:  Left upper back   Description of wound:  well healed scar   Closure Type: intermediate layered closure     Additional History of Present Condition:  Patient presents for wound check s/p mohs on 06/19/2024. He states he feels a twinge of pain every now and then. Other than that he has been pain free. Denies symptoms of infection.     Assessment and Plan:  Based on a thorough discussion of this condition and the management approach to it (including a comprehensive discussion of the known risks, side effects and potential benefits of treatment), the patient (family) agrees to implement the following specific plan:  On exam, wound appears well healed. No signs of infection at this time.   Advised patient to use caution in the area and to report any new onset symptoms to us.

## 2024-09-27 ENCOUNTER — NURSE TRIAGE (OUTPATIENT)
Age: 84
End: 2024-09-27

## 2024-09-27 DIAGNOSIS — R39.9 UTI SYMPTOMS: Primary | ICD-10-CM

## 2024-09-27 NOTE — TELEPHONE ENCOUNTER
"Patient of Chu Pool, last seen 6/12/2024, called stating he is sometimes having frequency and urgency. He started to notice these symptoms within the past week or so. Denies fever, chills, nausea, vomiting, abdominal pain, burning sensation, painful urination, or cloudy urine. He wants to rule out the possibility of a UTI. I ordered a UA and culture. Faxed the scripts to [facility] in [place]. I reviewed ED precautions, encouraged water intake, and avoiding bladder irritants. Please advise.    Reason for Disposition   All other urine symptoms    Answer Assessment - Initial Assessment Questions  1. SYMPTOM: \"What's the main symptom you're concerned about?\" (e.g., frequency, incontinence)      Frequency and urgency    2. ONSET: \"When did the symptoms start?\"      Past week or so    3. PAIN: \"Is there any pain?\" If Yes, ask: \"How bad is it?\" (Scale: 1-10; mild, moderate, severe)      Back hurts all the time    5. OTHER SYMPTOMS: \"Do you have any other symptoms?\" (e.g., fever, flank pain, blood in urine, pain with urination)      Denies    Protocols used: Urinary Symptoms-ADULT-[place]    "

## 2024-09-30 NOTE — TELEPHONE ENCOUNTER
Urine culture is positive. On report, it says susceptibilities to follow. Are we able to obtain these susceptibilities for guided treatment?

## 2024-10-01 NOTE — TELEPHONE ENCOUNTER
Patient informed that antibiotic was sent to [facility] pharmacy on file;  reviewed ER precautions;  patient will call back symptoms fail to improve.

## 2024-10-26 LAB — PSA SERPL-MCNC: 7.47 NG/ML

## 2024-12-27 ENCOUNTER — OFFICE VISIT (OUTPATIENT)
Age: 84
End: 2024-12-27
Payer: COMMERCIAL

## 2024-12-27 VITALS
BODY MASS INDEX: 30.66 KG/M2 | SYSTOLIC BLOOD PRESSURE: 152 MMHG | HEIGHT: 69 IN | WEIGHT: 207 LBS | OXYGEN SATURATION: 98 % | DIASTOLIC BLOOD PRESSURE: 80 MMHG | HEART RATE: 61 BPM

## 2024-12-27 DIAGNOSIS — R97.20 ELEVATED PSA: Primary | ICD-10-CM

## 2024-12-27 PROCEDURE — 99213 OFFICE O/P EST LOW 20 MIN: CPT | Performed by: PHYSICIAN ASSISTANT

## 2024-12-27 NOTE — PROGRESS NOTES
UROLOGY PROGRESS NOTE   Patient Identifiers: Joel Millard (MRN [id number])  Date of Service: 12/27/2024    Subjective:   84-year-old man history of elevated PSA.  His last PSA was 7.74 which was within his range.  He has never had a prostate biopsy he gets up 1 time per night.  His current PSA 7.47.    Reason for visit: Elevated PSA follow-up    Objective:     VITALS:    There were no vitals filed for this visit.        LABS:  Lab Results   Component Value Date    HGB 13.4 09/01/2023    HCT 40.5 09/01/2023    WBC 8.31 09/01/2023     09/01/2023   ]    Lab Results   Component Value Date    K 4.7 10/25/2024     10/25/2024    CO2 31 10/25/2024    BUN 18 10/25/2024    CREATININE 1.04 10/25/2024    CALCIUM 9.5 10/25/2024   ]        INPATIENT MEDS:    Current Outpatient Medications:     Aloe Vera 99 % GEL, Apply topically, Disp: , Rfl:     ascorbic acid (VITAMIN C) 500 MG tablet, Take 500 mg by mouth daily, Disp: , Rfl:     b complex vitamins capsule, Take 1 capsule by mouth daily, Disp: , Rfl:     beta carotene 30993 UNIT capsule, Take 10,000 Units by mouth daily, Disp: , Rfl:     Co Q-10 50 MG, Take 100 mg by mouth, Disp: , Rfl:     CRANBERRY FRUIT PO, Take by mouth, Disp: , Rfl:     DEODORIZED GARLIC PO, Take by mouth, Disp: , Rfl:     Flaquita, Zingiber officinalis, (Flaquita Root) 500 MG CAPS, Take by mouth, Disp: , Rfl:     Ginkgo Biloba 60 MG CAPS, Take by mouth, Disp: , Rfl:     Ginsengs-[place] Jelly (Ginseng Complex/Royal Jelly) 800-200 MG CAPS, Take by mouth, Disp: , Rfl:     glucosamine-chondroitin 500-400 MG tablet, Take 1 tablet by mouth 3 (three) times a day, Disp: , Rfl:     [place] Berries 565 MG CAPS, Take by mouth, Disp: , Rfl:     latanoprost (XALATAN) 0.005 % ophthalmic solution, , Disp: , Rfl:     latanoprost (XALATAN) 0.005 % ophthalmic solution, Administer 1 drop to both eyes daily at bedtime, Disp: 5 mL, Rfl: 0    Multiple Vitamins-Minerals (OCUVITE ADULT 50+ PO), Take by mouth, Disp:  , Rfl:     Multiple Vitamins-Minerals (ONE DAILY MENS PO), Take by mouth, Disp: , Rfl:     Nutritional Supplements (ULTRAMINO SOY PROTEIN PO), Take by mouth, Disp: , Rfl:     Omega 3-6-9 Fatty Acids (TRIPLE OMEGA-3-6-9 PO), Take by mouth, Disp: , Rfl:     Omega-3 Fatty Acids (Ultra Omega-3 Fish Oil) 1400 MG CAPS, Take by mouth, Disp: , Rfl:     PAPAYA ENZYME PO, Take by mouth, Disp: , Rfl:     Phosphatidylserine (Neuro-PS) 50 MG CAPS, Take 100 mg by mouth, Disp: , Rfl:     Phytosterol Esters (PHYTOSTEROL COMPLEX PO), Take by mouth, Disp: , Rfl:     polyethylene glycol (MIRALAX) 17 g packet, Take 17 g by mouth daily Do not start before September 3, 2023., Disp: 14 each, Rfl: 0    Saw Palmetto 450 MG CAPS, Take by mouth, Disp: , Rfl:     selenium 50 MCG TABS, Take 50 mcg by mouth daily, Disp: , Rfl:     senna (SENOKOT) 8.6 mg, Take 2 tablets (17.2 mg total) by mouth daily Do not start before September 3, 2023., Disp: 30 tablet, Rfl: 0    Turmeric Curcumin 500 MG CAPS, Take by mouth, Disp: , Rfl:     vitamin B-12 (VITAMIN B-12) 500 mcg tablet, Take by mouth daily, Disp: , Rfl:     vitamin E, tocopherol, 200 units capsule, Take 200 Units by mouth daily, Disp: , Rfl:       Physical Exam:   There were no vitals taken for this visit.  GEN: no acute distress    RESP: breathing comfortably with no accessory muscle use    ABD: soft, non-tender, non-distended   INCISION:    EXT: no significant peripheral edema   (Male): Penis circumcised, phallus normal, meatus patent.  Testicles descended into scrotum bilaterally without masses nor tenderness.  No inguinal hernias bilaterally.  RASHID: Prostate is enlarged at 45 grams. The prostate is not boggy. The prostate is not tender.  Firm ridge on the right side and to a lesser extent on the left  RADIOLOGY:   None    Assessment:   #1.  Elevated PSA    Plan:   -I think he has an abnormal RASHID and I recommend he have a multiparametric MRI  -I will call him with any significant findings  which I explained  -Otherwise follow-up in 6 months with PSA prior to visit  -

## 2025-03-14 ENCOUNTER — TELEPHONE (OUTPATIENT)
Age: 85
End: 2025-03-14

## 2025-03-14 DIAGNOSIS — R39.9 UTI SYMPTOMS: Primary | ICD-10-CM

## 2025-03-14 NOTE — TELEPHONE ENCOUNTER
Patient called requesting to be tested for UTI.     Patient does NOT have any urinary symptoms. His spouse had no symptoms and had urine testing completed by PCP; positive for UTI.     Patient wanted to be checked to be sure he did not have one, and gone undetected.     Ordered urine testing- urine micro and culture    Faxed orders to [employer] fax#[phone]

## 2025-03-14 NOTE — TELEPHONE ENCOUNTER
Generally would only recommend urine testing for UTI symptoms however can complete as order and monitor for results.

## 2025-03-18 LAB
APPEARANCE UR: CLEAR
BACTERIA UR QL AUTO: ABNORMAL /HPF
BILIRUB UR QL STRIP: NEGATIVE
COLOR UR: YELLOW
GLUCOSE UR QL STRIP: NEGATIVE
HGB UR QL STRIP: NEGATIVE
HYALINE CASTS #/AREA URNS LPF: ABNORMAL /LPF
KETONES UR QL STRIP: ABNORMAL
LEUKOCYTE ESTERASE UR QL STRIP: NEGATIVE
NITRITE UR QL STRIP: NEGATIVE
PH UR STRIP: 5.5 [PH] (ref 5–8)
PROT UR QL STRIP: ABNORMAL
RBC #/AREA URNS HPF: ABNORMAL /HPF
SP GR UR STRIP: 1.02 (ref 1–1.03)
SQUAMOUS #/AREA URNS HPF: ABNORMAL /HPF
WBC #/AREA URNS HPF: ABNORMAL /HPF

## 2025-03-19 ENCOUNTER — RESULTS FOLLOW-UP (OUTPATIENT)
Dept: UROLOGY | Facility: CLINIC | Age: 85
End: 2025-03-19

## 2025-04-25 ENCOUNTER — OFFICE VISIT (OUTPATIENT)
Age: 85
End: 2025-04-25
Payer: COMMERCIAL

## 2025-04-25 DIAGNOSIS — H26.9 INCIPIENT CATARACT OF BOTH EYES: ICD-10-CM

## 2025-04-25 DIAGNOSIS — H52.03 HYPEROPIA OF BOTH EYES WITH ASTIGMATISM AND PRESBYOPIA: ICD-10-CM

## 2025-04-25 DIAGNOSIS — H52.203 HYPEROPIA OF BOTH EYES WITH ASTIGMATISM AND PRESBYOPIA: ICD-10-CM

## 2025-04-25 DIAGNOSIS — H52.4 HYPEROPIA OF BOTH EYES WITH ASTIGMATISM AND PRESBYOPIA: ICD-10-CM

## 2025-04-25 DIAGNOSIS — H52.221 REGULAR ASTIGMATISM OF RIGHT EYE: ICD-10-CM

## 2025-04-25 DIAGNOSIS — H40.053 PRIMARY OCULAR HYPERTENSION OF BOTH EYES: Primary | ICD-10-CM

## 2025-04-25 PROCEDURE — 76514 ECHO EXAM OF EYE THICKNESS: CPT | Performed by: OPHTHALMOLOGY

## 2025-04-25 PROCEDURE — 92020 GONIOSCOPY: CPT | Performed by: OPHTHALMOLOGY

## 2025-04-25 PROCEDURE — 99203 OFFICE O/P NEW LOW 30 MIN: CPT | Performed by: OPHTHALMOLOGY

## 2025-04-25 PROCEDURE — 92133 CPTRZD OPH DX IMG PST SGM ON: CPT | Performed by: OPHTHALMOLOGY

## 2025-04-25 NOTE — PROGRESS NOTES
Name: Joel Millard      : 1940      MRN: [id number]  Encounter Provider: Richard Veliz DO  Encounter Date: 2025   Encounter department: [facility] OPHTHALMOLOGY  :  Assessment & Plan  Hyperopia of both eyes with astigmatism and presbyopia  OD>OS  -has a slab off prism       Regular astigmatism of right eye  -appears fairly regular on nancy (pcam) today  -has abmd ou that is likely contributing to the cyl ou (though more cyl od than os)       Primary ocular hypertension of both eyes  -discussed options  -has below avg cct ou  -angle is open ou  -I feel we are ok to cpm ou with Lat  -rnfl oct is wnl ou  -gcl oct shows mild diffuse thinning ou  Orders:    OCT, Optic Nerve - OU - Both Eyes    Incipient cataract of both eyes  -pt pleased with va  -monitor    Return in about 6 months (around 10/25/2025) for Refraction.                 Joel Millard is a 84 y.o. male who presents today for Glaucoma evaluation. Pt has glaucoma for 10+ years. He was seeing Dr. Musa and Dr. Martinez. He has been on Latanoprost QHS OU. IOP stable per patient. TMAX 26 OU, patient denies any HX of laser or surgery   History obtained from: patient    Review of Systems  Medical History Reviewed by provider this encounter:     .     Past Ocular History: Glaucoma OU  Ocular Meds/Drops:   Latanoprost QHS OU - last night at 12 midnight  HPI       Glaucoma    In both eyes.  Treatment compliance is always.             Comments    -new patient for glauc eval  [facility] [employer]    POH:  glauc ou for 10 yrs  POSH:  neg  FOH:  fr-glauc  OC MEDS:  lat ou qhs - ld last pm  Systane bid-tid    Has slab off prism  Glasses since age 8            Last edited by Richard Veliz DO on 2025  7:17 PM.          Base Eye Exam       Visual Acuity (Snellen - Linear)         Right Left    Dist cc 20/25 20/30              Tonometry (gat, 2:21 PM)         Right Left    Pressure 22 25              Pachymetry (2025)          Right Left    Thickness 542 498              Gonioscopy         Right Left    Temporal ss ss    Nasal ss ss    Superior ss ss    Inferior ss ss/ptm              Pupils         Pupils Dark APD    Right PERRL 3 -    Left PERRL 3 -   dc             Visual Fields         Left Right     Full Full              Extraocular Movement         Right Left     Full, Ortho Full, Ortho              Neuro/Psych       Oriented x3: Yes                  Slit Lamp and Fundus Exam       External Exam         Right Left    External Normal Normal              Slit Lamp Exam         Right Left    Lids/Lashes Normal Normal    Conjunctiva/Sclera White and quiet White and quiet    Cornea +1-2 abmd +1-2 abmd    Anterior Chamber Deep and quiet Deep and quiet    Iris Round and reactive Round and reactive    Lens +2nsc +2nsc    Anterior Vitreous No PVD, clear, no cell No PVD, clear, no cell              Fundus Exam         Right Left    Disc sharp, no pallor sharp, no pallor    C/D Ratio ~0.4 0.25    Macula flat flat    Vessels normal in caliber normal in caliber    driving                  Refraction       Wearing Rx         Sphere Cylinder Axis    Right +7.00 -4.50 014    Left +4.75 -1.75 065      Age: 3y   OS has ~6D CT prism and 1 BD prism                     IMAGING:  OCT, Optic Nerve - OU - Both Eyes          Right Eye  Reliability was good.     Left Eye  Reliability was good.     Notes  RNFL:  OD: wnl  OS: wnl    GCL:  OD: mild diffuse thinning  OS: mild diffuse thinning         I personally spent 30 minutes today (exclusive of time spent performing separately billable services) providing care for this patient, including preparation, face-to-face time, EMR documentation and other services such as review of medical records, diagnostic results, patient education, counseling, and coordination of care.

## 2025-06-02 ENCOUNTER — TELEPHONE (OUTPATIENT)
Age: 85
End: 2025-06-02

## 2025-06-02 NOTE — TELEPHONE ENCOUNTER
Patient called to confirm his upcoming office visit on 6/20.  Also confirmed the MRI of prostate needs to be scheduled.  Central scheduling # provided.

## 2025-06-09 ENCOUNTER — NURSE TRIAGE (OUTPATIENT)
Age: 85
End: 2025-06-09

## 2025-06-09 DIAGNOSIS — R39.9 UTI SYMPTOMS: Primary | ICD-10-CM

## 2025-06-09 NOTE — TELEPHONE ENCOUNTER
REASON FOR CONVERSATION: Urinary Frequency    SYMPTOMS: Urinary frequency x 3 days. Denies any pain, burning, fevers or vomiting     OTHER HEALTH INFORMATION: Patient reports having a history of UTI's     PROTOCOL DISPOSITION: Order Lab Work (overriding See Within 2 Weeks in Office)    CARE ADVICE PROVIDED: advised to increase water intake and avoid bladder irritants. ED precautions reviewed. Placed urine orders to rule out infection.     PRACTICE FOLLOW-UP: Monitor for urine results. Faxed to [employer] at [phone]     Reason for Disposition   The patient has new BPH symptoms: frequency, urgency, nocturia, incontinence with concern for retention issues    Answer Assessment - Initial Assessment Questions  1. When did your urinary frequency begin? Can you describe if you are voiding normal or small amounts of urine? Do you have any other urinary symptoms such as incontinence, nocturia (urinating frequently at night), weak urine stream or dysuria (discomfort, pain such as burning, itching, or stinging?)  3 days ago, normal amounts, no other symptoms   2. Have you seen any blood in your urine?   No  3. Do you have a fever of 101 or higher?   No  5. Have you had a recent urologic surgery or procedure?  No    Protocols used: Urology-Urinary Frequency-ADULT-[place]

## 2025-06-13 NOTE — TELEPHONE ENCOUNTER
Patient called for urine results. He states he still is having urine frequency. Denies fever.  Patient noticed the abnormal for the urine culture.  Please advise and call back #[phone]    Urine culture   Component  Ref Range & Units (hover)    Urine Culture Result  Abnormal  P    Comment:    CULTURE, URINE, ROUTINE       Micro Number:      [id number]    Test Status:       Preliminary    Specimen Source:   Urine, clean catch    Specimen Quality:  Adequate    Result:            10,000-49,000 CFU/mL of Proteus mirabilis      Comment:           A portion of the results were performed at [facility].              Narrative    FASTING:NO    FASTING: NO   Specimen Collected: 06/10/25 12:04 Last Resulted: 06/13/25 07:46     UA (URINE) with reflex to Scope   Component  Ref Range & Units (hover) 6/10/25 1204 3/17/25 1147 5/21/24 1244 4/13/24 1108 3/13/24 1243 12/7/23 1420 9/1/23 0407   Color UA YELLOW YELLOW YELLOW Light Yellow R Yellow R Light Yellow R Light Yellow R   Urine Appearance CLEAR CLEAR CLEAR       Specific Gravity 1.017 1.021 1.018       Ph 5.5 5.5 < OR = 5.0       Glucose, Urine NEGATIVE NEGATIVE NEGATIVE Negative R Negative R Negative R Negative R   Bilirubin, Urine NEGATIVE NEGATIVE NEGATIVE       Ketone, Urine NEGATIVE 1+ Abnormal  NEGATIVE Negative R Negative R Negative R 10 (1+) Abnormal  R   Blood, Urine NEGATIVE NEGATIVE NEGATIVE Negative R Negative R Negative R Negative R   Protein, Urine NEGATIVE TRACE Abnormal  NEGATIVE Negative R Trace Abnormal  R Negative R Negative R   Nitrites Urine NEGATIVE NEGATIVE NEGATIVE Negative R Negative R Negative R Negative R   Leukocyte Esterase NEGATIVE NEGATIVE NEGATIVE Negative R Negative R Negative R Negative R   SL AMB WBC, URINE  NONE SEEN R NONE SEEN R 1-2 R 1-2 R None Seen R    Urobilinogen, UA    <2.0 R <2.0 R <2.0 R <2.0 R   Bilirubin, UA    Negative R Negative R Negative R Negative R   Epithelial Cells    None Seen R None Seen R None Seen R    MUCUS THREADS      Occasional Abnormal  R     RBC, Urine  NONE SEEN R NONE SEEN R 1-2 R None Seen R None Seen R    Squamous Epithelial Cells  0-5 R NONE SEEN R       Bacteria, UA  NONE SEEN R NONE SEEN R None Seen R None Seen R None Seen R    Hyaline Casts  NONE SEEN R NONE SEEN R       Comment(s)  CM CM       Clarity, UA    Clear Clear Clear Clear   Specific Gravity, UA    1.023 R 1.029 R 1.013 R 1.010 R   pH, UA    6.5 R 5.5 R 5.0 R 5.5 R              Narrative    FASTING:NO    FASTING: NO   Specimen Collected: 06/10/25 12:04 Last Resulted: 06/13/25 07:46

## 2025-06-14 LAB
APPEARANCE UR: CLEAR
BILIRUB UR QL STRIP: NEGATIVE
COLOR UR: YELLOW
GLUCOSE UR QL STRIP: NEGATIVE
HGB UR QL STRIP: NEGATIVE
KETONES UR QL STRIP: NEGATIVE
LEUKOCYTE ESTERASE UR QL STRIP: NEGATIVE
NITRITE UR QL STRIP: NEGATIVE
PH UR STRIP: 5.5 [PH] (ref 5–8)
PROT UR QL STRIP: NEGATIVE
SP GR UR STRIP: 1.02 (ref 1–1.03)

## 2025-06-16 DIAGNOSIS — R39.9 UTI SYMPTOMS: Primary | ICD-10-CM

## 2025-06-16 DIAGNOSIS — N39.0 URINARY TRACT INFECTION WITHOUT HEMATURIA, SITE UNSPECIFIED: Primary | ICD-10-CM

## 2025-06-16 RX ORDER — CEFDINIR 300 MG/1
300 CAPSULE ORAL EVERY 12 HOURS SCHEDULED
Qty: 14 CAPSULE | Refills: 0 | Status: SHIPPED | OUTPATIENT
Start: 2025-06-16 | End: 2025-06-23

## 2025-06-16 RX ORDER — CIPROFLOXACIN 500 MG/1
500 TABLET, FILM COATED ORAL EVERY 12 HOURS SCHEDULED
Qty: 14 TABLET | Refills: 0 | Status: SHIPPED | OUTPATIENT
Start: 2025-06-16 | End: 2025-06-23

## 2025-06-16 NOTE — TELEPHONE ENCOUNTER
Called and spoke with patient. Results relayed and advised abx sent to pharmacy. Pt verbalized understanding.

## 2025-06-16 NOTE — TELEPHONE ENCOUNTER
Patient called back in, happy that an ABX was brooks din, but states last time he took Cipro it caused him a lot of joint pain. Requesting a different ABX, also has concerns about any meds that may cause irregular heartbeat. Please advise.

## 2025-06-16 NOTE — TELEPHONE ENCOUNTER
Patient had UCX done on 6/10, patient called re results on 6/13 and again this AM.     [facility] #[id number] - [place] - [address] [[id number]]

## 2025-06-18 NOTE — TELEPHONE ENCOUNTER
Patient called in. Patient is on Cefdnir prescribed by us. Has dental procedure coming up and has Rx for Amoxicillin for that. Patient will be finished with Cefdnir by the time he is to start the Amoxicillin. Advised him to call dentist to see if they still want him to take.

## 2025-06-19 NOTE — TELEPHONE ENCOUNTER
Patient state that he is taking the Cefdinir, state that he can't take Cipro due to joint pain.    He started with intermittent mild itching on arms, back, abdomen that started yesterday. Also complains that after he takes the medicine getting abdominal pain, he has been taking with food. He states he does not want to take as he is concerned itching and abdominal discomfort will worsen    Advised to stop taking and will inform the provider

## 2025-06-20 ENCOUNTER — OFFICE VISIT (OUTPATIENT)
Age: 85
End: 2025-06-20
Payer: COMMERCIAL

## 2025-06-20 VITALS
BODY MASS INDEX: 29.47 KG/M2 | TEMPERATURE: 97.4 F | HEIGHT: 69 IN | OXYGEN SATURATION: 97 % | SYSTOLIC BLOOD PRESSURE: 140 MMHG | WEIGHT: 199 LBS | HEART RATE: 66 BPM | DIASTOLIC BLOOD PRESSURE: 80 MMHG

## 2025-06-20 DIAGNOSIS — R97.20 ELEVATED PSA: Primary | ICD-10-CM

## 2025-06-20 DIAGNOSIS — R39.9 UTI SYMPTOMS: ICD-10-CM

## 2025-06-20 DIAGNOSIS — N39.0 URINARY TRACT INFECTION WITHOUT HEMATURIA, SITE UNSPECIFIED: ICD-10-CM

## 2025-06-20 LAB
SL AMB  POCT GLUCOSE, UA: NORMAL
SL AMB LEUKOCYTE ESTERASE,UA: NORMAL
SL AMB POCT BILIRUBIN,UA: NORMAL
SL AMB POCT BLOOD,UA: NORMAL
SL AMB POCT CLARITY,UA: CLEAR
SL AMB POCT COLOR,UA: YELLOW
SL AMB POCT KETONES,UA: NORMAL
SL AMB POCT NITRITE,UA: NORMAL
SL AMB POCT PH,UA: 6
SL AMB POCT SPECIFIC GRAVITY,UA: 1
SL AMB POCT URINE PROTEIN: NORMAL
SL AMB POCT UROBILINOGEN: NORMAL

## 2025-06-20 PROCEDURE — 81002 URINALYSIS NONAUTO W/O SCOPE: CPT | Performed by: PHYSICIAN ASSISTANT

## 2025-06-20 PROCEDURE — 99213 OFFICE O/P EST LOW 20 MIN: CPT | Performed by: PHYSICIAN ASSISTANT

## 2025-06-20 PROCEDURE — 87086 URINE CULTURE/COLONY COUNT: CPT | Performed by: PHYSICIAN ASSISTANT

## 2025-06-20 RX ORDER — AMOXICILLIN 500 MG/1
500 CAPSULE ORAL DAILY
COMMUNITY
Start: 2025-06-16

## 2025-06-20 NOTE — PROGRESS NOTES
"Name: Joel Millard      : 1940      MRN: [id number]  Encounter Provider: Chico Pool PA-C  Encounter Date: 2025   Encounter department: [facility]  :  Assessment & Plan  Elevated PSA  Check repeat culture  Follow up with his psa prior in 4 months           History of Present Illness   Joel Millard is a 84 y.o. male who presents history of elevated PSA.  Never had a prostate biopsy or an MRI.  His last PSA was 7.47.  I ordered MRI and a repeat PSA neither which were done.  Currently he has a UTI with low growth of Proteus.  He was given cefdinir which was resistant and is now on ciprofloxacin.Stopped cipro due to side effects.     Review of Systems       Objective   Pulse 66   Temp (!) 97.4 °F (36.3 °C) (Tympanic)   Ht 5' 9\" (1.753 m)   Wt 90.3 kg (199 lb)   SpO2 97%   BMI 29.39 kg/m²     Physical Exam GEN: no acute distress    RESP: breathing comfortably with no accessory muscle use    ABD: soft, non-tender, non-distended   INCISION:    EXT: no significant peripheral edema       RADIOLOGY:   none        Results   Lab Results   Component Value Date    PSA 7.47 (H) 10/25/2024    PSA 7.74 (H) 05/10/2024    PSA 7.81 (H) 2024     Lab Results   Component Value Date    CALCIUM 9 2025    K 3.8 2025    CO2 32 2025     2025    BUN 21 2025    CREATININE 1.16 2025     Lab Results   Component Value Date    WBC 8.31 2023    HGB 13.4 2023    HCT 40.5 2023    MCV 91 2023     2023       Office Urine Dip  No results found for this or any previous visit (from the past hour).      "

## 2025-06-21 LAB — BACTERIA UR CULT: NORMAL

## 2025-06-25 ENCOUNTER — TELEPHONE (OUTPATIENT)
Age: 85
End: 2025-06-25

## 2025-06-25 NOTE — TELEPHONE ENCOUNTER
Patient called for urine results.  Urine culture  Order: [id number]   Status: Final result       Next appt: 06/26/2025 at 03:15 PM in Radiology (BE MRI [facility] 1)       Dx: Elevated PSA; UTI symptoms; Urinary t...    Test Result Released: No (inaccessible in [facility])    Specimen Information: Urine   0 Result Notes  Urine Culture No Growth <1000 cfu/mL             Specimen Collected: 06/20/25 15:02 Last Resulted: 06/21/25 15:33      Negative results given.  Patient states he is still urinating frequently.

## 2025-06-26 ENCOUNTER — HOSPITAL ENCOUNTER (OUTPATIENT)
Dept: RADIOLOGY | Age: 85
Discharge: HOME | End: 2025-06-26
Attending: PHYSICIAN ASSISTANT
Payer: COMMERCIAL

## 2025-06-26 DIAGNOSIS — R97.20 ELEVATED PSA: ICD-10-CM

## 2025-06-26 PROCEDURE — 72197 MRI PELVIS W/O & W/DYE: CPT

## 2025-06-26 PROCEDURE — A9585 GADOBUTROL INJECTION: HCPCS | Performed by: PHYSICIAN ASSISTANT

## 2025-06-26 PROCEDURE — 76377 3D RENDER W/INTRP POSTPROCES: CPT

## 2025-06-26 RX ORDER — GADOBUTROL 604.72 MG/ML
9 INJECTION INTRAVENOUS
Status: COMPLETED | OUTPATIENT
Start: 2025-06-26 | End: 2025-06-26

## 2025-06-26 RX ADMIN — GADOBUTROL 9 ML: 604.72 INJECTION INTRAVENOUS at 15:42

## 2025-07-03 NOTE — TELEPHONE ENCOUNTER
Patient state that he received a letter from Insurance that MRI was denied and will not pay.  Insurance states guidelines were not followed

## 2025-07-15 ENCOUNTER — NURSE TRIAGE (OUTPATIENT)
Age: 85
End: 2025-07-15

## 2025-07-15 DIAGNOSIS — R39.9 UTI SYMPTOMS: Primary | ICD-10-CM

## 2025-07-17 LAB
APPEARANCE UR: CLEAR
BACTERIA UR QL AUTO: ABNORMAL /HPF
BILIRUB UR QL STRIP: NEGATIVE
COLOR UR: ABNORMAL
GLUCOSE UR QL STRIP: NEGATIVE
HGB UR QL STRIP: NEGATIVE
HYALINE CASTS #/AREA URNS LPF: ABNORMAL /LPF
KETONES UR QL STRIP: NEGATIVE
LEUKOCYTE ESTERASE UR QL STRIP: NEGATIVE
NITRITE UR QL STRIP: NEGATIVE
PH UR STRIP: ABNORMAL [PH] (ref 5–8)
PROT UR QL STRIP: NEGATIVE
RBC #/AREA URNS HPF: ABNORMAL /HPF
SP GR UR STRIP: 1.02 (ref 1–1.03)
SQUAMOUS #/AREA URNS HPF: ABNORMAL /HPF
WBC #/AREA URNS HPF: ABNORMAL /HPF

## 2025-07-21 ENCOUNTER — RESULTS FOLLOW-UP (OUTPATIENT)
Dept: UROLOGY | Facility: CLINIC | Age: 85
End: 2025-07-21

## 2025-07-21 NOTE — TELEPHONE ENCOUNTER
----- Message from Chico Pool PA-C sent at 7/20/2025  5:25 PM EDT -----  PSA now over 11.  Let him know these results and see if he will reconsider a prostate biopsy?  He declined but I spoke to him recently.  ----- Message -----  From: Interface, Transcription Incoming  Sent: 7/19/2025   4:55 PM EDT  To: Chico Pool PA-C

## 2025-07-25 NOTE — TELEPHONE ENCOUNTER
"Called and spoke to the patient to review Chu's message above. Patient is not concerned with elevated PSA as he believes the MRI contrast was still in his system at time of blood draw and caused the elevation. Advised patient that this may not be the case as the MRI was about 3 weeks before his blood draw. Patient explained that he felt strange after the MRI and that his urine was \"warmer than usual, not burning but warmer\". Patient defers biopsy until after re-evaluation of PSA level in a few months.  "

## (undated) DEVICE — FIBER STD QUANTA 365 MICRON

## (undated) DEVICE — GUIDEWIRE STRGHT TIP 0.035 IN  SOLO PLUS

## (undated) DEVICE — GLOVE SRG BIOGEL ECLIPSE 7.5

## (undated) DEVICE — SYRINGE 10ML LL

## (undated) DEVICE — PACK TUR

## (undated) DEVICE — BASKET STONE RTRVL ZERO TIP 2.4FR

## (undated) DEVICE — SPECIMEN CONTAINER STERILE PEEL PACK

## (undated) DEVICE — CATH URETERAL 5FR X 70 CM FLEX TIP POLYUR BARD